# Patient Record
Sex: FEMALE | Race: OTHER | HISPANIC OR LATINO
[De-identification: names, ages, dates, MRNs, and addresses within clinical notes are randomized per-mention and may not be internally consistent; named-entity substitution may affect disease eponyms.]

---

## 2018-05-04 ENCOUNTER — APPOINTMENT (OUTPATIENT)
Dept: UROGYNECOLOGY | Facility: CLINIC | Age: 38
End: 2018-05-04
Payer: COMMERCIAL

## 2018-05-04 ENCOUNTER — LABORATORY RESULT (OUTPATIENT)
Age: 38
End: 2018-05-04

## 2018-05-04 DIAGNOSIS — Z87.440 PERSONAL HISTORY OF URINARY (TRACT) INFECTIONS: ICD-10-CM

## 2018-05-04 PROBLEM — Z00.00 ENCOUNTER FOR PREVENTIVE HEALTH EXAMINATION: Status: ACTIVE | Noted: 2018-05-04

## 2018-05-04 PROCEDURE — 99204 OFFICE O/P NEW MOD 45 MIN: CPT

## 2018-05-04 PROCEDURE — 51798 US URINE CAPACITY MEASURE: CPT

## 2018-06-01 ENCOUNTER — APPOINTMENT (OUTPATIENT)
Dept: UROGYNECOLOGY | Facility: CLINIC | Age: 38
End: 2018-06-01
Payer: COMMERCIAL

## 2018-06-01 PROBLEM — N39.46 URGE AND STRESS INCONTINENCE: Status: ACTIVE | Noted: 2018-05-04

## 2018-06-01 PROCEDURE — 99214 OFFICE O/P EST MOD 30 MIN: CPT

## 2018-06-05 ENCOUNTER — APPOINTMENT (OUTPATIENT)
Dept: UROGYNECOLOGY | Facility: CLINIC | Age: 38
End: 2018-06-05
Payer: COMMERCIAL

## 2018-06-05 DIAGNOSIS — N32.81 OVERACTIVE BLADDER: ICD-10-CM

## 2018-06-05 DIAGNOSIS — N39.41 URGE INCONTINENCE: ICD-10-CM

## 2018-06-05 DIAGNOSIS — N39.46 MIXED INCONTINENCE: ICD-10-CM

## 2018-06-05 DIAGNOSIS — N36.41 HYPERMOBILITY OF URETHRA: ICD-10-CM

## 2018-06-05 DIAGNOSIS — N81.6 RECTOCELE: ICD-10-CM

## 2018-06-05 PROCEDURE — 51784 ANAL/URINARY MUSCLE STUDY: CPT

## 2018-06-05 PROCEDURE — 51798 US URINE CAPACITY MEASURE: CPT

## 2018-06-05 PROCEDURE — 51741 ELECTRO-UROFLOWMETRY FIRST: CPT

## 2018-06-05 PROCEDURE — 51729 CYSTOMETROGRAM W/VP&UP: CPT

## 2018-06-05 PROCEDURE — 51797 INTRAABDOMINAL PRESSURE TEST: CPT

## 2018-06-05 PROCEDURE — 99213 OFFICE O/P EST LOW 20 MIN: CPT | Mod: 25

## 2018-06-15 ENCOUNTER — APPOINTMENT (OUTPATIENT)
Dept: UROGYNECOLOGY | Facility: CLINIC | Age: 38
End: 2018-06-15
Payer: COMMERCIAL

## 2018-06-15 ENCOUNTER — LABORATORY RESULT (OUTPATIENT)
Age: 38
End: 2018-06-15

## 2018-06-15 DIAGNOSIS — N39.3 STRESS INCONTINENCE (FEMALE) (MALE): ICD-10-CM

## 2018-06-15 DIAGNOSIS — N81.4 UTEROVAGINAL PROLAPSE, UNSPECIFIED: ICD-10-CM

## 2018-06-15 PROCEDURE — 99213 OFFICE O/P EST LOW 20 MIN: CPT

## 2018-06-18 RX ORDER — AMOXICILLIN 500 MG/1
500 TABLET, FILM COATED ORAL EVERY 8 HOURS
Qty: 21 | Refills: 0 | Status: ACTIVE | COMMUNITY
Start: 2018-06-18 | End: 1900-01-01

## 2018-06-25 ENCOUNTER — OUTPATIENT (OUTPATIENT)
Dept: OUTPATIENT SERVICES | Facility: HOSPITAL | Age: 38
LOS: 1 days | End: 2018-06-25

## 2018-06-25 ENCOUNTER — APPOINTMENT (OUTPATIENT)
Dept: SURGERY | Facility: CLINIC | Age: 38
End: 2018-06-25

## 2018-06-25 VITALS
DIASTOLIC BLOOD PRESSURE: 74 MMHG | SYSTOLIC BLOOD PRESSURE: 101 MMHG | HEIGHT: 63 IN | WEIGHT: 134.92 LBS | TEMPERATURE: 99 F | HEART RATE: 63 BPM | RESPIRATION RATE: 16 BRPM

## 2018-06-25 DIAGNOSIS — N81.2 INCOMPLETE UTEROVAGINAL PROLAPSE: ICD-10-CM

## 2018-06-25 DIAGNOSIS — N39.3 STRESS INCONTINENCE (FEMALE) (MALE): ICD-10-CM

## 2018-06-25 DIAGNOSIS — Z98.82 BREAST IMPLANT STATUS: Chronic | ICD-10-CM

## 2018-06-25 DIAGNOSIS — K08.409 PARTIAL LOSS OF TEETH, UNSPECIFIED CAUSE, UNSPECIFIED CLASS: Chronic | ICD-10-CM

## 2018-06-25 DIAGNOSIS — N81.11 CYSTOCELE, MIDLINE: ICD-10-CM

## 2018-06-25 DIAGNOSIS — Z86.69 PERSONAL HISTORY OF OTHER DISEASES OF THE NERVOUS SYSTEM AND SENSE ORGANS: Chronic | ICD-10-CM

## 2018-06-25 DIAGNOSIS — E55.9 VITAMIN D DEFICIENCY, UNSPECIFIED: ICD-10-CM

## 2018-06-25 DIAGNOSIS — F41.1 GENERALIZED ANXIETY DISORDER: ICD-10-CM

## 2018-06-25 DIAGNOSIS — Z01.818 ENCOUNTER FOR OTHER PREPROCEDURAL EXAMINATION: ICD-10-CM

## 2018-06-25 LAB
HCG SERPL-ACNC: 0.1 MIU/ML — SIGNIFICANT CHANGE UP
HCT VFR BLD CALC: 39.5 % — SIGNIFICANT CHANGE UP (ref 34.5–45)
HGB BLD-MCNC: 12.5 G/DL — SIGNIFICANT CHANGE UP (ref 11.5–15.5)
MCHC RBC-ENTMCNC: 26.8 PG — LOW (ref 27–34)
MCHC RBC-ENTMCNC: 31.6 G/DL — LOW (ref 32–36)
MCV RBC AUTO: 84.8 FL — SIGNIFICANT CHANGE UP (ref 80–100)
PLATELET # BLD AUTO: 271 K/UL — SIGNIFICANT CHANGE UP (ref 150–400)
RBC # BLD: 4.66 M/UL — SIGNIFICANT CHANGE UP (ref 3.8–5.2)
RBC # FLD: 14.3 % — SIGNIFICANT CHANGE UP (ref 10.3–16.9)
WBC # BLD: 4 K/UL — SIGNIFICANT CHANGE UP (ref 3.8–10.5)
WBC # FLD AUTO: 4 K/UL — SIGNIFICANT CHANGE UP (ref 3.8–10.5)

## 2018-06-25 NOTE — H&P PST ADULT - FAMILY HISTORY
Father  Still living? Yes, Estimated age: Age Unknown  Family history of hypertension in father, Age at diagnosis: Age Unknown     Mother  Still living? Yes, Estimated age: Age Unknown  Family history of hypertension in mother, Age at diagnosis: Age Unknown

## 2018-06-25 NOTE — ASU PATIENT PROFILE, ADULT - PSH
H/O perforation of tympanic membrane  right  Status post breast augmentation    [place] teeth extracted

## 2018-06-25 NOTE — H&P PST ADULT - PSH
H/O perforation of tympanic membrane  right  Status post breast augmentation    [place] teeth extracted

## 2018-06-25 NOTE — H&P PST ADULT - HISTORY OF PRESENT ILLNESS
38 year old female with uterovaginal prolapse(N81.1), stress incontinence(N39.3), and cystocele(N81.11) 38 year old female with uterovaginal prolapse(N81.1), stress incontinence(N39.3), and cystocele(N81.11) for ten years.  Ultrasound confirmed diagnosis.  Patient with UTI being treated with amoxicillin right now.

## 2018-06-26 DIAGNOSIS — F41.1 GENERALIZED ANXIETY DISORDER: ICD-10-CM

## 2018-06-26 DIAGNOSIS — E55.9 VITAMIN D DEFICIENCY, UNSPECIFIED: ICD-10-CM

## 2018-07-03 RX ORDER — AMOXICILLIN 250 MG/5ML
1 SUSPENSION, RECONSTITUTED, ORAL (ML) ORAL
Qty: 0 | Refills: 0 | COMMUNITY

## 2018-07-05 ENCOUNTER — INPATIENT (INPATIENT)
Facility: HOSPITAL | Age: 38
LOS: 0 days | Discharge: HOME | DRG: 743 | End: 2018-07-06
Attending: OBSTETRICS & GYNECOLOGY | Admitting: OBSTETRICS & GYNECOLOGY
Payer: COMMERCIAL

## 2018-07-05 ENCOUNTER — APPOINTMENT (OUTPATIENT)
Dept: UROGYNECOLOGY | Facility: HOSPITAL | Age: 38
End: 2018-07-05
Payer: COMMERCIAL

## 2018-07-05 ENCOUNTER — RESULT REVIEW (OUTPATIENT)
Age: 38
End: 2018-07-05

## 2018-07-05 ENCOUNTER — TRANSCRIPTION ENCOUNTER (OUTPATIENT)
Age: 38
End: 2018-07-05

## 2018-07-05 VITALS
HEIGHT: 64 IN | RESPIRATION RATE: 16 BRPM | TEMPERATURE: 97 F | DIASTOLIC BLOOD PRESSURE: 61 MMHG | WEIGHT: 145.06 LBS | OXYGEN SATURATION: 100 % | SYSTOLIC BLOOD PRESSURE: 115 MMHG | HEART RATE: 55 BPM

## 2018-07-05 DIAGNOSIS — Z86.69 PERSONAL HISTORY OF OTHER DISEASES OF THE NERVOUS SYSTEM AND SENSE ORGANS: Chronic | ICD-10-CM

## 2018-07-05 DIAGNOSIS — Z98.82 BREAST IMPLANT STATUS: Chronic | ICD-10-CM

## 2018-07-05 DIAGNOSIS — K08.409 PARTIAL LOSS OF TEETH, UNSPECIFIED CAUSE, UNSPECIFIED CLASS: Chronic | ICD-10-CM

## 2018-07-05 PROCEDURE — 57283 COLPOPEXY INTRAPERITONEAL: CPT | Mod: 59

## 2018-07-05 PROCEDURE — 57288 REPAIR BLADDER DEFECT: CPT

## 2018-07-05 PROCEDURE — 58262 VAG HYST INCLUDING T/O: CPT

## 2018-07-05 PROCEDURE — 57240 ANTERIOR COLPORRHAPHY: CPT

## 2018-07-05 RX ORDER — DIPHENHYDRAMINE HCL 50 MG
25 CAPSULE ORAL ONCE
Qty: 0 | Refills: 0 | Status: COMPLETED | OUTPATIENT
Start: 2018-07-05 | End: 2018-07-05

## 2018-07-05 RX ORDER — DOCUSATE SODIUM 100 MG
100 CAPSULE ORAL
Qty: 0 | Refills: 0 | Status: DISCONTINUED | OUTPATIENT
Start: 2018-07-06 | End: 2018-07-06

## 2018-07-05 RX ORDER — KETOROLAC TROMETHAMINE 30 MG/ML
30 SYRINGE (ML) INJECTION EVERY 6 HOURS
Qty: 0 | Refills: 0 | Status: COMPLETED | OUTPATIENT
Start: 2018-07-05 | End: 2018-07-06

## 2018-07-05 RX ORDER — OXYCODONE HYDROCHLORIDE 5 MG/1
5 TABLET ORAL EVERY 6 HOURS
Qty: 0 | Refills: 0 | Status: DISCONTINUED | OUTPATIENT
Start: 2018-07-05 | End: 2018-07-06

## 2018-07-05 RX ORDER — METOCLOPRAMIDE HCL 10 MG
10 TABLET ORAL ONCE
Qty: 0 | Refills: 0 | Status: DISCONTINUED | OUTPATIENT
Start: 2018-07-05 | End: 2018-07-06

## 2018-07-05 RX ORDER — CHOLECALCIFEROL (VITAMIN D3) 125 MCG
0 CAPSULE ORAL
Qty: 0 | Refills: 0 | COMMUNITY

## 2018-07-05 RX ORDER — ONDANSETRON 8 MG/1
8 TABLET, FILM COATED ORAL EVERY 6 HOURS
Qty: 0 | Refills: 0 | Status: COMPLETED | OUTPATIENT
Start: 2018-07-05 | End: 2018-07-06

## 2018-07-05 RX ORDER — HYDROMORPHONE HYDROCHLORIDE 2 MG/ML
0.5 INJECTION INTRAMUSCULAR; INTRAVENOUS; SUBCUTANEOUS
Qty: 0 | Refills: 0 | Status: DISCONTINUED | OUTPATIENT
Start: 2018-07-05 | End: 2018-07-05

## 2018-07-05 RX ORDER — ACETAMINOPHEN 500 MG
1000 TABLET ORAL ONCE
Qty: 0 | Refills: 0 | Status: COMPLETED | OUTPATIENT
Start: 2018-07-05 | End: 2018-07-05

## 2018-07-05 RX ORDER — OXYCODONE HYDROCHLORIDE 5 MG/1
10 TABLET ORAL EVERY 6 HOURS
Qty: 0 | Refills: 0 | Status: DISCONTINUED | OUTPATIENT
Start: 2018-07-05 | End: 2018-07-06

## 2018-07-05 RX ORDER — SODIUM CHLORIDE 9 MG/ML
1000 INJECTION, SOLUTION INTRAVENOUS
Qty: 0 | Refills: 0 | Status: DISCONTINUED | OUTPATIENT
Start: 2018-07-05 | End: 2018-07-06

## 2018-07-05 RX ORDER — HYDROMORPHONE HYDROCHLORIDE 2 MG/ML
0.5 INJECTION INTRAMUSCULAR; INTRAVENOUS; SUBCUTANEOUS
Qty: 0 | Refills: 0 | Status: DISCONTINUED | OUTPATIENT
Start: 2018-07-05 | End: 2018-07-06

## 2018-07-05 RX ORDER — ACETAMINOPHEN 500 MG
650 TABLET ORAL EVERY 6 HOURS
Qty: 0 | Refills: 0 | Status: DISCONTINUED | OUTPATIENT
Start: 2018-07-05 | End: 2018-07-06

## 2018-07-05 RX ADMIN — ONDANSETRON 8 MILLIGRAM(S): 8 TABLET, FILM COATED ORAL at 22:16

## 2018-07-05 RX ADMIN — HYDROMORPHONE HYDROCHLORIDE 0.5 MILLIGRAM(S): 2 INJECTION INTRAMUSCULAR; INTRAVENOUS; SUBCUTANEOUS at 11:45

## 2018-07-05 RX ADMIN — HYDROMORPHONE HYDROCHLORIDE 0.5 MILLIGRAM(S): 2 INJECTION INTRAMUSCULAR; INTRAVENOUS; SUBCUTANEOUS at 16:00

## 2018-07-05 RX ADMIN — Medication 30 MILLIGRAM(S): at 17:22

## 2018-07-05 RX ADMIN — HYDROMORPHONE HYDROCHLORIDE 0.5 MILLIGRAM(S): 2 INJECTION INTRAMUSCULAR; INTRAVENOUS; SUBCUTANEOUS at 13:00

## 2018-07-05 RX ADMIN — HYDROMORPHONE HYDROCHLORIDE 0.5 MILLIGRAM(S): 2 INJECTION INTRAMUSCULAR; INTRAVENOUS; SUBCUTANEOUS at 12:10

## 2018-07-05 RX ADMIN — Medication 400 MILLIGRAM(S): at 17:06

## 2018-07-05 RX ADMIN — HYDROMORPHONE HYDROCHLORIDE 0.5 MILLIGRAM(S): 2 INJECTION INTRAMUSCULAR; INTRAVENOUS; SUBCUTANEOUS at 12:25

## 2018-07-05 RX ADMIN — ONDANSETRON 8 MILLIGRAM(S): 8 TABLET, FILM COATED ORAL at 17:07

## 2018-07-05 RX ADMIN — Medication 30 MILLIGRAM(S): at 17:07

## 2018-07-05 RX ADMIN — HYDROMORPHONE HYDROCHLORIDE 0.5 MILLIGRAM(S): 2 INJECTION INTRAMUSCULAR; INTRAVENOUS; SUBCUTANEOUS at 12:45

## 2018-07-05 RX ADMIN — HYDROMORPHONE HYDROCHLORIDE 0.5 MILLIGRAM(S): 2 INJECTION INTRAMUSCULAR; INTRAVENOUS; SUBCUTANEOUS at 19:27

## 2018-07-05 RX ADMIN — HYDROMORPHONE HYDROCHLORIDE 0.5 MILLIGRAM(S): 2 INJECTION INTRAMUSCULAR; INTRAVENOUS; SUBCUTANEOUS at 15:23

## 2018-07-05 RX ADMIN — OXYCODONE HYDROCHLORIDE 10 MILLIGRAM(S): 5 TABLET ORAL at 18:19

## 2018-07-05 RX ADMIN — Medication 25 MILLIGRAM(S): at 13:55

## 2018-07-05 RX ADMIN — Medication 1000 MILLIGRAM(S): at 17:22

## 2018-07-05 RX ADMIN — Medication 650 MILLIGRAM(S): at 23:15

## 2018-07-05 RX ADMIN — HYDROMORPHONE HYDROCHLORIDE 0.5 MILLIGRAM(S): 2 INJECTION INTRAMUSCULAR; INTRAVENOUS; SUBCUTANEOUS at 12:05

## 2018-07-05 RX ADMIN — Medication 650 MILLIGRAM(S): at 22:15

## 2018-07-05 RX ADMIN — HYDROMORPHONE HYDROCHLORIDE 0.5 MILLIGRAM(S): 2 INJECTION INTRAMUSCULAR; INTRAVENOUS; SUBCUTANEOUS at 11:30

## 2018-07-05 RX ADMIN — HYDROMORPHONE HYDROCHLORIDE 0.5 MILLIGRAM(S): 2 INJECTION INTRAMUSCULAR; INTRAVENOUS; SUBCUTANEOUS at 11:50

## 2018-07-05 NOTE — DISCHARGE NOTE ADULT - CARE PROVIDER_API CALL
Latia March (MD), Female Pelvic MedReconst Surg; Obstetrics and Gynecology  [address] [place] [address]  Phone: [phone]  Fax: [phone]

## 2018-07-05 NOTE — PATIENT PROFILE ADULT. - PSH
H/O perforation of tympanic membrane  right  Status post breast augmentation    [place] teeth extracted

## 2018-07-05 NOTE — DISCHARGE NOTE ADULT - CARE PLAN
Principal Discharge DX:	Stress incontinence of urine  Goal:	healthy recovery!  Assessment and plan of treatment:	Nothing in the vagina for 8 weeks and until cleared by Dr. Dorman. Eat a regular diet. No baths/pools. Shower is ok. No heavy lifting for 8 weeks. Go see Dr. Dorman in 1-2 weeks. Return to ER if you have fever >101, severe abdominal pain, heavy vaginal bleeding, or inability to pee. Principal Discharge DX:	Stress incontinence of urine  Goal:	healthy recovery!  Assessment and plan of treatment:	Nothing in the vagina for 8 weeks and until cleared by Dr. Dorman. Eat a regular diet. No baths/pools. Shower is ok. Go see Dr. Dorman in 1 week. Return to ER if you have fever >101, severe abdominal pain, heavy vaginal bleeding, or inability to pee. See separate sheet for further instructions.

## 2018-07-05 NOTE — H&P ADULT - HISTORY OF PRESENT ILLNESS
39yo para 2 with prolapse and stress urinary incontinence presents for hysterectomy.  x 2, kids ages 18 and 13. Symptoms for 10 years. LMP 6/10/18 with heavy menses. Nothing for contraception at this time. No significant medical history. Recently had UTI and completed a course of amoxicillin. Now just on vitamins. Surgical history significant for breast augmentation. NKDA. Hb 12.5.

## 2018-07-05 NOTE — BRIEF OPERATIVE NOTE - PRE-OP DX
Prolapse of anterior vaginal wall  07/05/2018  prolapse and stress urinary incontience  Active  Kya Buenrostro

## 2018-07-05 NOTE — H&P ADULT - PSH
H/O perforation of tympanic membrane  right  Status post breast augmentation    [place] teeth extracted

## 2018-07-05 NOTE — PROGRESS NOTE ADULT - SUBJECTIVE AND OBJECTIVE BOX
GYN PROGRESS NOTE PGY4 - POSTOP CHECK    Patient evaluated at the bedside. No acute events. She just got to the floor from PACU. She is sleepy but states she is still in pain. She received dilaudid 0.5mg x 2 in the PACU as well as benadryl for itching. Will attempt dilaudid 0.5mg now for pain control as patient does not want to take oxycodone and states she is in a significant amount of pain. She will receive tylenol and toradol in a couple hours. She denies current itching. She does not have a rash. She denies nausea/vomiting. Demonstrated incentive spirometer use at the bedside. Braun in place with adequate UOP, still blue from the dye. UOP 150cc/4 hrs in PACU.     T(C): 36.4 (07-05-18 @ 15:04), Max: 36.4 (07-05-18 @ 14:30)  HR: 70 (07-05-18 @ 15:04) (55 - 75)  BP: 100/64 (07-05-18 @ 15:04) (90/52 - 115/61)  RR: 16 (07-05-18 @ 15:04) (13 - 21)  SpO2: 98% (07-05-18 @ 15:04) (98% - 100%)    GEN: patient appears well  LUNGS: no respiratory distress, 02 saturation 91-97% on RA, will keep on 2L NC  ABD: soft, NTND, steri strips cdi  EXT: no calf tenderness        07-05 @ 07:01  -  07-05 @ 15:38  --------------------------------------------------------  IN: 0 mL / OUT: 150 mL / NET: -150 mL

## 2018-07-05 NOTE — DISCHARGE NOTE ADULT - PATIENT PORTAL LINK FT
You can access the [facility] Patient Portal, offered by [facility], by registering with the following website: [email or web address]

## 2018-07-05 NOTE — DISCHARGE NOTE ADULT - PLAN OF CARE
healthy recovery! Nothing in the vagina for 8 weeks and until cleared by Dr. Dorman. Eat a regular diet. No baths/pools. Shower is ok. No heavy lifting for 8 weeks. Go see Dr. Dorman in 1-2 weeks. Return to ER if you have fever >101, severe abdominal pain, heavy vaginal bleeding, or inability to pee. Nothing in the vagina for 8 weeks and until cleared by Dr. Dorman. Eat a regular diet. No baths/pools. Shower is ok. Go see Dr. Dorman in 1 week. Return to ER if you have fever >101, severe abdominal pain, heavy vaginal bleeding, or inability to pee. See separate sheet for further instructions.

## 2018-07-05 NOTE — DISCHARGE NOTE ADULT - MEDICATION SUMMARY - MEDICATIONS TO TAKE
I will START or STAY ON the medications listed below when I get home from the hospital:  None I will START or STAY ON the medications listed below when I get home from the hospital:    Percocet 5/325 oral tablet  -- 1 tab(s) by mouth every 8 hours MDD:MDD: 3 tablets  -- Caution federal law prohibits the transfer of this drug to any person other  than the person for whom it was prescribed.  May cause drowsiness.  Alcohol may intensify this effect.  Use care when operating dangerous machinery.  This prescription cannot be refilled.  This product contains acetaminophen.  Do not use  with any other product containing acetaminophen to prevent possible liver damage.  Using more of this medication than prescribed may cause serious breathing problems.    -- Indication: For Pain    Macrodantin 50 mg oral capsule  -- 1 cap(s) by mouth once a day MDD:MDD: 1 tablet  -- Finish all this medication unless otherwise directed by prescriber.  May discolor urine or feces.  Take with food or milk.    -- Indication: For Catheter

## 2018-07-05 NOTE — PROGRESS NOTE ADULT - ASSESSMENT
A/P: POD0 TVH BS, uterosacral ligament suspension, TVT, anterior repair, cystoscopy  1. FEN - LR@125, ordered for regular diet but has not tried clears yet  2. PAIN - dilaudid 0.5mg q 3 hrs prn, toradol, tylenol, oxycodone prn  3.  - mcdaniel in place with adequate UOP, active TOV in AM  4. RESP - IS, 2L NC  5. VTE prophylaxis - scds, ambulate  6. LABS - AM CBC, BMP  7. DISPO - dc home in AM if stable

## 2018-07-05 NOTE — DISCHARGE NOTE ADULT - HOSPITAL COURSE
Underwent TVH and prolapse repair and TVT. Postop milestones met. Uneventful. Underwent TVH and prolapse repair and TVT. Postop milestones met. Failed active TOV. Uneventful.

## 2018-07-05 NOTE — H&P ADULT - ASSESSMENT
37yo F with prolapse and incontinence  - total vaginal hysterectomy, bilateral salpingectomy, AP repair, midurethral sling, uterosacral suspension, cystoscopy, and any indicated procedures  - admit

## 2018-07-05 NOTE — BRIEF OPERATIVE NOTE - OPERATION/FINDINGS
Anterior wall prolapse. 8 week size mobile anteverted uterus with small posterior fibroid. Normal tubes and ovaries. Uterus and tubes removed. US ligament suspension performed. Anterior repair performed. Trocar used for TVT. Cystoscopy performed 3 times throughout the case and mcdaniel replaced at the end. No posterior repair needed. Rectal exam performed. Trocar sites covered with dermabond and steri strips. EBL 100cc.

## 2018-07-05 NOTE — BRIEF OPERATIVE NOTE - PROCEDURE
<<-----Click on this checkbox to enter Procedure Hysterectomy  07/05/2018  TVH BS, UTEROSACRAL LIGAMENT SUSPENSION, ANTERIOR REPAIR, TVT, CYSTOSCOPY  Active  SBAUM

## 2018-07-06 VITALS
OXYGEN SATURATION: 100 % | TEMPERATURE: 98 F | HEART RATE: 78 BPM | RESPIRATION RATE: 18 BRPM | DIASTOLIC BLOOD PRESSURE: 86 MMHG | SYSTOLIC BLOOD PRESSURE: 153 MMHG

## 2018-07-06 VITALS
DIASTOLIC BLOOD PRESSURE: 68 MMHG | OXYGEN SATURATION: 99 % | HEART RATE: 66 BPM | TEMPERATURE: 98 F | RESPIRATION RATE: 18 BRPM | SYSTOLIC BLOOD PRESSURE: 102 MMHG

## 2018-07-06 LAB
ANION GAP SERPL CALC-SCNC: 11 MMOL/L — SIGNIFICANT CHANGE UP (ref 5–17)
BUN SERPL-MCNC: 7 MG/DL — SIGNIFICANT CHANGE UP (ref 7–23)
CALCIUM SERPL-MCNC: 9.1 MG/DL — SIGNIFICANT CHANGE UP (ref 8.4–10.5)
CHLORIDE SERPL-SCNC: 99 MMOL/L — SIGNIFICANT CHANGE UP (ref 96–108)
CO2 SERPL-SCNC: 26 MMOL/L — SIGNIFICANT CHANGE UP (ref 22–31)
CREAT SERPL-MCNC: 0.73 MG/DL — SIGNIFICANT CHANGE UP (ref 0.5–1.3)
GLUCOSE SERPL-MCNC: 124 MG/DL — HIGH (ref 70–99)
HCT VFR BLD CALC: 32.5 % — LOW (ref 34.5–45)
HGB BLD-MCNC: 10.2 G/DL — LOW (ref 11.5–15.5)
MCHC RBC-ENTMCNC: 27 PG — SIGNIFICANT CHANGE UP (ref 27–34)
MCHC RBC-ENTMCNC: 31.4 G/DL — LOW (ref 32–36)
MCV RBC AUTO: 86 FL — SIGNIFICANT CHANGE UP (ref 80–100)
PLATELET # BLD AUTO: 212 K/UL — SIGNIFICANT CHANGE UP (ref 150–400)
POTASSIUM SERPL-MCNC: 3.5 MMOL/L — SIGNIFICANT CHANGE UP (ref 3.5–5.3)
POTASSIUM SERPL-SCNC: 3.5 MMOL/L — SIGNIFICANT CHANGE UP (ref 3.5–5.3)
RBC # BLD: 3.78 M/UL — LOW (ref 3.8–5.2)
RBC # FLD: 13.9 % — SIGNIFICANT CHANGE UP (ref 10.3–16.9)
SODIUM SERPL-SCNC: 136 MMOL/L — SIGNIFICANT CHANGE UP (ref 135–145)
WBC # BLD: 10.4 K/UL — SIGNIFICANT CHANGE UP (ref 3.8–10.5)
WBC # FLD AUTO: 10.4 K/UL — SIGNIFICANT CHANGE UP (ref 3.8–10.5)

## 2018-07-06 PROCEDURE — 99285 EMERGENCY DEPT VISIT HI MDM: CPT | Mod: 25

## 2018-07-06 RX ORDER — MORPHINE SULFATE 50 MG/1
2 CAPSULE, EXTENDED RELEASE ORAL ONCE
Qty: 0 | Refills: 0 | Status: DISCONTINUED | OUTPATIENT
Start: 2018-07-06 | End: 2018-07-06

## 2018-07-06 RX ORDER — NITROFURANTOIN MACROCRYSTAL 50 MG
1 CAPSULE ORAL
Qty: 7 | Refills: 0 | OUTPATIENT
Start: 2018-07-06 | End: 2018-07-12

## 2018-07-06 RX ORDER — ONDANSETRON 8 MG/1
4 TABLET, FILM COATED ORAL ONCE
Qty: 0 | Refills: 0 | Status: COMPLETED | OUTPATIENT
Start: 2018-07-06 | End: 2018-07-06

## 2018-07-06 RX ORDER — SODIUM CHLORIDE 9 MG/ML
1000 INJECTION INTRAMUSCULAR; INTRAVENOUS; SUBCUTANEOUS ONCE
Qty: 0 | Refills: 0 | Status: COMPLETED | OUTPATIENT
Start: 2018-07-06 | End: 2018-07-06

## 2018-07-06 RX ADMIN — Medication 30 MILLIGRAM(S): at 06:01

## 2018-07-06 RX ADMIN — Medication 30 MILLIGRAM(S): at 00:30

## 2018-07-06 RX ADMIN — ONDANSETRON 8 MILLIGRAM(S): 8 TABLET, FILM COATED ORAL at 06:01

## 2018-07-06 RX ADMIN — OXYCODONE HYDROCHLORIDE 10 MILLIGRAM(S): 5 TABLET ORAL at 01:35

## 2018-07-06 RX ADMIN — Medication 650 MILLIGRAM(S): at 11:02

## 2018-07-06 RX ADMIN — HYDROMORPHONE HYDROCHLORIDE 0.5 MILLIGRAM(S): 2 INJECTION INTRAMUSCULAR; INTRAVENOUS; SUBCUTANEOUS at 02:14

## 2018-07-06 RX ADMIN — HYDROMORPHONE HYDROCHLORIDE 0.5 MILLIGRAM(S): 2 INJECTION INTRAMUSCULAR; INTRAVENOUS; SUBCUTANEOUS at 01:59

## 2018-07-06 RX ADMIN — OXYCODONE HYDROCHLORIDE 10 MILLIGRAM(S): 5 TABLET ORAL at 00:35

## 2018-07-06 RX ADMIN — Medication 650 MILLIGRAM(S): at 07:00

## 2018-07-06 RX ADMIN — Medication 100 MILLIGRAM(S): at 06:01

## 2018-07-06 RX ADMIN — OXYCODONE HYDROCHLORIDE 10 MILLIGRAM(S): 5 TABLET ORAL at 10:50

## 2018-07-06 RX ADMIN — Medication 30 MILLIGRAM(S): at 07:00

## 2018-07-06 RX ADMIN — OXYCODONE HYDROCHLORIDE 10 MILLIGRAM(S): 5 TABLET ORAL at 09:46

## 2018-07-06 RX ADMIN — Medication 650 MILLIGRAM(S): at 06:02

## 2018-07-06 RX ADMIN — Medication 30 MILLIGRAM(S): at 00:00

## 2018-07-06 NOTE — ED ADULT NURSE NOTE - OBJECTIVE STATEMENT
The patient is a 37 y/o female who came in c/o abdominal pain and not able to post hysterectomy yesterday. Pt has urinary catheter placed yesterday during surgery.

## 2018-07-06 NOTE — ED ADULT TRIAGE NOTE - ARRIVAL INFO ADDITIONAL COMMENTS
Pt walked into ED with c/o of suprapubic cramping abd pain. Onset was 4pm after she was discharged from a hysterectomy. Pt has a fley catheter with normal urine output and has passed gassed but has not has bowel movement. Pt states she may be constipated. PT oxycodone at 9pm with no relief. Pt denies vaginal discharge abnormal bleeding, N+V+D, fever. Denies redness or bleeding from surgery site

## 2018-07-06 NOTE — PROGRESS NOTE ADULT - SUBJECTIVE AND OBJECTIVE BOX
GYN PROGRESS NOTE PGY4    Patient evaluated at the bedside. No acute events. Denies CP/SOB/dizziness/nausea/vomiting/abdominal pain/calf pain. Some pain from below overnight but managed on toradol/tylenol/oxycodone. s/p dilaudid. minimal vaginal bleeding. ambulated. no flatus. mcdaniel still in place with adequate UOP. Tolerating regular diet.    T(C): 36.7 (07-06-18 @ 05:52), Max: 37 (07-06-18 @ 00:33)  HR: 66 (07-06-18 @ 05:52) (55 - 80)  BP: 97/64 (07-06-18 @ 05:52) (90/52 - 115/61)  RR: 17 (07-06-18 @ 05:52) (13 - 21)  SpO2: 97% (07-06-18 @ 05:52) (95% - 100%)    GEN: patient appears well  LUNGS: no respiratory distress  ABD: soft, nt, nd, steri strips from TVT cdi  : minimal VB  EXT: no calf tenderness        07-05 @ 07:01  -  07-06 @ 06:31  --------------------------------------------------------  IN: 1750 mL / OUT: 2350 mL / NET: -600 mL

## 2018-07-06 NOTE — ED ADULT NURSE NOTE - PSH
H/O perforation of tympanic membrane  right  Status post breast augmentation    [place] teeth extracted

## 2018-07-06 NOTE — PROGRESS NOTE ADULT - ASSESSMENT
A/P: POD1 TVH, BS, CYSTOSCOPY, US LIGAMENT SUSPENSION, TVT, ANTERIOR REPAIR  1. FEN - on IVH, will have regular breakfast  2. PAIN - OPM, toradol  3.  - mcdaniel in place, will do active TOV this AM  4. RESP -  IS  5. VTE prophylaxis - scd  6. LABS - cbc, bmp  7. DISPO -dc home today if stable

## 2018-07-07 ENCOUNTER — TRANSCRIPTION ENCOUNTER (OUTPATIENT)
Age: 38
End: 2018-07-07

## 2018-07-07 ENCOUNTER — INPATIENT (INPATIENT)
Facility: HOSPITAL | Age: 38
LOS: 0 days | Discharge: HOME | DRG: 948 | End: 2018-07-07
Attending: OBSTETRICS & GYNECOLOGY | Admitting: OBSTETRICS & GYNECOLOGY
Payer: COMMERCIAL

## 2018-07-07 VITALS
TEMPERATURE: 99 F | SYSTOLIC BLOOD PRESSURE: 99 MMHG | OXYGEN SATURATION: 100 % | RESPIRATION RATE: 16 BRPM | HEART RATE: 65 BPM | DIASTOLIC BLOOD PRESSURE: 65 MMHG

## 2018-07-07 DIAGNOSIS — Z86.69 PERSONAL HISTORY OF OTHER DISEASES OF THE NERVOUS SYSTEM AND SENSE ORGANS: Chronic | ICD-10-CM

## 2018-07-07 DIAGNOSIS — K08.409 PARTIAL LOSS OF TEETH, UNSPECIFIED CAUSE, UNSPECIFIED CLASS: Chronic | ICD-10-CM

## 2018-07-07 DIAGNOSIS — Z98.82 BREAST IMPLANT STATUS: Chronic | ICD-10-CM

## 2018-07-07 DIAGNOSIS — Z90.710 ACQUIRED ABSENCE OF BOTH CERVIX AND UTERUS: Chronic | ICD-10-CM

## 2018-07-07 LAB
ALBUMIN SERPL ELPH-MCNC: 3.7 G/DL — SIGNIFICANT CHANGE UP (ref 3.3–5)
ALP SERPL-CCNC: 49 U/L — SIGNIFICANT CHANGE UP (ref 40–120)
ALT FLD-CCNC: 55 U/L — HIGH (ref 10–45)
ANION GAP SERPL CALC-SCNC: 15 MMOL/L — SIGNIFICANT CHANGE UP (ref 5–17)
AST SERPL-CCNC: 76 U/L — HIGH (ref 10–40)
BASOPHILS NFR BLD AUTO: 0.2 % — SIGNIFICANT CHANGE UP (ref 0–2)
BILIRUB SERPL-MCNC: 0.3 MG/DL — SIGNIFICANT CHANGE UP (ref 0.2–1.2)
BUN SERPL-MCNC: 5 MG/DL — LOW (ref 7–23)
CALCIUM SERPL-MCNC: 8.9 MG/DL — SIGNIFICANT CHANGE UP (ref 8.4–10.5)
CHLORIDE SERPL-SCNC: 101 MMOL/L — SIGNIFICANT CHANGE UP (ref 96–108)
CO2 SERPL-SCNC: 23 MMOL/L — SIGNIFICANT CHANGE UP (ref 22–31)
CREAT SERPL-MCNC: 0.65 MG/DL — SIGNIFICANT CHANGE UP (ref 0.5–1.3)
EOSINOPHIL NFR BLD AUTO: 0.7 % — SIGNIFICANT CHANGE UP (ref 0–6)
GLUCOSE SERPL-MCNC: 109 MG/DL — HIGH (ref 70–99)
HCT VFR BLD CALC: 31.5 % — LOW (ref 34.5–45)
HGB BLD-MCNC: 10.2 G/DL — LOW (ref 11.5–15.5)
LIDOCAIN IGE QN: 24 U/L — SIGNIFICANT CHANGE UP (ref 7–60)
LYMPHOCYTES # BLD AUTO: 29.8 % — SIGNIFICANT CHANGE UP (ref 13–44)
MCHC RBC-ENTMCNC: 27.1 PG — SIGNIFICANT CHANGE UP (ref 27–34)
MCHC RBC-ENTMCNC: 32.4 G/DL — SIGNIFICANT CHANGE UP (ref 32–36)
MCV RBC AUTO: 83.8 FL — SIGNIFICANT CHANGE UP (ref 80–100)
MONOCYTES NFR BLD AUTO: 5.9 % — SIGNIFICANT CHANGE UP (ref 2–14)
NEUTROPHILS NFR BLD AUTO: 63.4 % — SIGNIFICANT CHANGE UP (ref 43–77)
PLATELET # BLD AUTO: 188 K/UL — SIGNIFICANT CHANGE UP (ref 150–400)
POTASSIUM SERPL-MCNC: 3.4 MMOL/L — LOW (ref 3.5–5.3)
POTASSIUM SERPL-SCNC: 3.4 MMOL/L — LOW (ref 3.5–5.3)
PROT SERPL-MCNC: 6.8 G/DL — SIGNIFICANT CHANGE UP (ref 6–8.3)
RBC # BLD: 3.76 M/UL — LOW (ref 3.8–5.2)
RBC # FLD: 13.8 % — SIGNIFICANT CHANGE UP (ref 10.3–16.9)
SODIUM SERPL-SCNC: 139 MMOL/L — SIGNIFICANT CHANGE UP (ref 135–145)
WBC # BLD: 5.6 K/UL — SIGNIFICANT CHANGE UP (ref 3.8–10.5)
WBC # FLD AUTO: 5.6 K/UL — SIGNIFICANT CHANGE UP (ref 3.8–10.5)

## 2018-07-07 PROCEDURE — 87086 URINE CULTURE/COLONY COUNT: CPT

## 2018-07-07 PROCEDURE — 74177 CT ABD & PELVIS W/CONTRAST: CPT

## 2018-07-07 PROCEDURE — 99285 EMERGENCY DEPT VISIT HI MDM: CPT | Mod: 25

## 2018-07-07 PROCEDURE — 36415 COLL VENOUS BLD VENIPUNCTURE: CPT

## 2018-07-07 PROCEDURE — 74019 RADEX ABDOMEN 2 VIEWS: CPT | Mod: 26

## 2018-07-07 PROCEDURE — 81001 URINALYSIS AUTO W/SCOPE: CPT

## 2018-07-07 PROCEDURE — 96375 TX/PRO/DX INJ NEW DRUG ADDON: CPT

## 2018-07-07 PROCEDURE — 96374 THER/PROPH/DIAG INJ IV PUSH: CPT | Mod: XU

## 2018-07-07 PROCEDURE — 74019 RADEX ABDOMEN 2 VIEWS: CPT

## 2018-07-07 PROCEDURE — 74177 CT ABD & PELVIS W/CONTRAST: CPT | Mod: 26

## 2018-07-07 PROCEDURE — 85025 COMPLETE CBC W/AUTO DIFF WBC: CPT

## 2018-07-07 PROCEDURE — 83690 ASSAY OF LIPASE: CPT

## 2018-07-07 PROCEDURE — 80053 COMPREHEN METABOLIC PANEL: CPT

## 2018-07-07 RX ORDER — NITROFURANTOIN MACROCRYSTAL 50 MG
50 CAPSULE ORAL DAILY
Qty: 0 | Refills: 0 | Status: DISCONTINUED | OUTPATIENT
Start: 2018-07-07 | End: 2018-07-07

## 2018-07-07 RX ORDER — OXYCODONE HYDROCHLORIDE 5 MG/1
10 TABLET ORAL EVERY 6 HOURS
Qty: 0 | Refills: 0 | Status: DISCONTINUED | OUTPATIENT
Start: 2018-07-07 | End: 2018-07-07

## 2018-07-07 RX ORDER — ACETAMINOPHEN 500 MG
650 TABLET ORAL EVERY 6 HOURS
Qty: 0 | Refills: 0 | Status: DISCONTINUED | OUTPATIENT
Start: 2018-07-07 | End: 2018-07-07

## 2018-07-07 RX ORDER — IBUPROFEN 200 MG
600 TABLET ORAL EVERY 6 HOURS
Qty: 0 | Refills: 0 | Status: DISCONTINUED | OUTPATIENT
Start: 2018-07-07 | End: 2018-07-07

## 2018-07-07 RX ORDER — MORPHINE SULFATE 50 MG/1
2 CAPSULE, EXTENDED RELEASE ORAL ONCE
Qty: 0 | Refills: 0 | Status: DISCONTINUED | OUTPATIENT
Start: 2018-07-07 | End: 2018-07-07

## 2018-07-07 RX ORDER — OXYCODONE AND ACETAMINOPHEN 5; 325 MG/1; MG/1
2 TABLET ORAL EVERY 6 HOURS
Qty: 0 | Refills: 0 | Status: DISCONTINUED | OUTPATIENT
Start: 2018-07-07 | End: 2018-07-07

## 2018-07-07 RX ORDER — MAGNESIUM HYDROXIDE 400 MG/1
30 TABLET, CHEWABLE ORAL
Qty: 0 | Refills: 0 | Status: DISCONTINUED | OUTPATIENT
Start: 2018-07-07 | End: 2018-07-07

## 2018-07-07 RX ORDER — OXYCODONE HYDROCHLORIDE 5 MG/1
5 TABLET ORAL EVERY 4 HOURS
Qty: 0 | Refills: 0 | Status: DISCONTINUED | OUTPATIENT
Start: 2018-07-07 | End: 2018-07-07

## 2018-07-07 RX ORDER — IOHEXOL 300 MG/ML
30 INJECTION, SOLUTION INTRAVENOUS ONCE
Qty: 0 | Refills: 0 | Status: COMPLETED | OUTPATIENT
Start: 2018-07-07 | End: 2018-07-07

## 2018-07-07 RX ORDER — NITROFURANTOIN MACROCRYSTAL 50 MG
50 CAPSULE ORAL ONCE
Qty: 0 | Refills: 0 | Status: DISCONTINUED | OUTPATIENT
Start: 2018-07-07 | End: 2018-07-07

## 2018-07-07 RX ORDER — SODIUM CHLORIDE 9 MG/ML
1000 INJECTION, SOLUTION INTRAVENOUS
Qty: 0 | Refills: 0 | Status: DISCONTINUED | OUTPATIENT
Start: 2018-07-07 | End: 2018-07-07

## 2018-07-07 RX ORDER — SIMETHICONE 80 MG/1
80 TABLET, CHEWABLE ORAL
Qty: 0 | Refills: 0 | Status: DISCONTINUED | OUTPATIENT
Start: 2018-07-07 | End: 2018-07-07

## 2018-07-07 RX ORDER — DOCUSATE SODIUM 100 MG
100 CAPSULE ORAL
Qty: 0 | Refills: 0 | Status: DISCONTINUED | OUTPATIENT
Start: 2018-07-07 | End: 2018-07-07

## 2018-07-07 RX ORDER — SENNA PLUS 8.6 MG/1
2 TABLET ORAL AT BEDTIME
Qty: 0 | Refills: 0 | Status: DISCONTINUED | OUTPATIENT
Start: 2018-07-07 | End: 2018-07-07

## 2018-07-07 RX ADMIN — SODIUM CHLORIDE 1000 MILLILITER(S): 9 INJECTION INTRAMUSCULAR; INTRAVENOUS; SUBCUTANEOUS at 00:06

## 2018-07-07 RX ADMIN — Medication 650 MILLIGRAM(S): at 17:56

## 2018-07-07 RX ADMIN — Medication 600 MILLIGRAM(S): at 07:09

## 2018-07-07 RX ADMIN — OXYCODONE HYDROCHLORIDE 5 MILLIGRAM(S): 5 TABLET ORAL at 17:20

## 2018-07-07 RX ADMIN — MORPHINE SULFATE 2 MILLIGRAM(S): 50 CAPSULE, EXTENDED RELEASE ORAL at 04:54

## 2018-07-07 RX ADMIN — Medication 600 MILLIGRAM(S): at 12:13

## 2018-07-07 RX ADMIN — Medication 600 MILLIGRAM(S): at 13:10

## 2018-07-07 RX ADMIN — MORPHINE SULFATE 2 MILLIGRAM(S): 50 CAPSULE, EXTENDED RELEASE ORAL at 00:04

## 2018-07-07 RX ADMIN — Medication 650 MILLIGRAM(S): at 17:55

## 2018-07-07 RX ADMIN — MORPHINE SULFATE 2 MILLIGRAM(S): 50 CAPSULE, EXTENDED RELEASE ORAL at 01:58

## 2018-07-07 RX ADMIN — ONDANSETRON 4 MILLIGRAM(S): 8 TABLET, FILM COATED ORAL at 00:06

## 2018-07-07 RX ADMIN — Medication 600 MILLIGRAM(S): at 17:55

## 2018-07-07 RX ADMIN — Medication 650 MILLIGRAM(S): at 13:10

## 2018-07-07 RX ADMIN — OXYCODONE HYDROCHLORIDE 5 MILLIGRAM(S): 5 TABLET ORAL at 10:17

## 2018-07-07 RX ADMIN — Medication 650 MILLIGRAM(S): at 12:13

## 2018-07-07 RX ADMIN — Medication 50 MILLIGRAM(S): at 12:13

## 2018-07-07 RX ADMIN — SODIUM CHLORIDE 70 MILLILITER(S): 9 INJECTION, SOLUTION INTRAVENOUS at 04:54

## 2018-07-07 RX ADMIN — OXYCODONE HYDROCHLORIDE 5 MILLIGRAM(S): 5 TABLET ORAL at 11:10

## 2018-07-07 RX ADMIN — Medication 650 MILLIGRAM(S): at 07:09

## 2018-07-07 RX ADMIN — Medication 600 MILLIGRAM(S): at 17:56

## 2018-07-07 RX ADMIN — OXYCODONE HYDROCHLORIDE 5 MILLIGRAM(S): 5 TABLET ORAL at 16:24

## 2018-07-07 RX ADMIN — Medication 100 MILLIGRAM(S): at 07:09

## 2018-07-07 RX ADMIN — Medication 100 MILLIGRAM(S): at 17:55

## 2018-07-07 RX ADMIN — IOHEXOL 30 MILLILITER(S): 300 INJECTION, SOLUTION INTRAVENOUS at 02:41

## 2018-07-07 RX ADMIN — MORPHINE SULFATE 2 MILLIGRAM(S): 50 CAPSULE, EXTENDED RELEASE ORAL at 05:00

## 2018-07-07 NOTE — PROGRESS NOTE ADULT - ASSESSMENT
A/P:  38y   POD#2 after a TVH, BS, AP repair, midurethral sling, uterosacral suspension for hx of prolapse and EMILY presenting for worsening abdominal pain.  - Pain: s/p IVP Morphine 2g x2 overnight, Motrin ATC, Tylenol ATC, Oxycodone PRN, pain better controlled this AM  - : f/u CT abd/pelvis, per radiology resident no abnormalities noted, mcdaniel remains in place with adequate urine output, continue with Macrodantin 50mg daily (for 7days) for UTI prophylaxis  - GI: regular diet, +flatus, -BM,  bowel regimen including colace BID, Senna daily, Simethicone PRN, Milk of Magnesium PRN  - Fluids: LR @ 70cc, can d/c once increased intake   - Electrolytes: replete as needed  - Labs: CBC and BNP stable from discharge labs, repeat CBC/BNP labs in afternoon   -patient evaluated with Dr. Clarisa Pleitez PGY4 A/P:  38y   POD#2 after a TVH, BS, AP repair, midurethral sling, uterosacral suspension for hx of prolapse and EMILY presenting for worsening abdominal pain.  - Pain: s/p IVP Morphine 2g x2 overnight, Motrin ATC, Tylenol ATC, Oxycodone PRN, pain better controlled this AM  - : f/u CT abd/pelvis final read, per radiology resident no abnormalities noted, mcdaniel remains in place with adequate urine output, continue with Macrodantin 50mg daily (for 7days) for UTI prophylaxis  - GI: regular diet, +flatus, -BM,  bowel regimen including colace BID, Senna daily, Simethicone PRN, Milk of Magnesium PRN  - Fluids: LR @ 70cc, can d/c once increased intake   - Electrolytes: replete as needed  - Labs: CBC and BNP stable from discharge labs, repeat CBC/BNP labs in afternoon   -patient evaluated with Dr. Clarisa Pleitez PGY4

## 2018-07-07 NOTE — DISCHARGE NOTE ADULT - CARE PROVIDER_API CALL
Latia March (MD), Female Pelvic MedReconst Surg; Obstetrics and Gynecology  [address] [place] [address]  Phone: [phone]  Fax: [phone]

## 2018-07-07 NOTE — DISCHARGE NOTE ADULT - CONDITIONS AT DISCHARGE
stable, mcdaniel catheter remained intact, patent with clear yellow adequate output, was observed ambulated numerous times in the room

## 2018-07-07 NOTE — CONSULT NOTE ADULT - SUBJECTIVE AND OBJECTIVE BOX
38y   POD#2 after a TVH, BS, AP repair, midurethral sling, uterosacral suspension for hx of prolapse and EMILY presenting for lower abdominal pain. Patient was discharged POD1 with mcdaniel given failed TOV and motrin and percocets for pain.      . Symptoms for 10 years.   No significant medical history. Recently had UTI and completed a course of amoxicillin. Now just on vitamins. Surgical history significant for breast augmentation. NKDA. Hb 12.5.     Denies fever, chills, chest pain, palpitations, SOB, n/v.  +flatus, +BM    OB H/x:  G1-    G2-     Gyn H/x:  Menstrual Cycle:   LMP 6/10/18 with heavy menses.  H/x of cysts, fibroids, endometriosis:  H/x of STIs:  Current Contraceptive use: s/p TVH and BS  Name of GYN Physician:     PAST MEDICAL & SURGICAL HISTORY:  Urinary incontinence  [place] teeth extracted  H/O perforation of tympanic membrane: right  Status post breast augmentation  TVH, BS, AP repair, midurethral sling, uterosacral suspension    MEDICATIONS  (STANDING):    MEDICATIONS  (PRN):      Allergies    No Known Allergies    Intolerances        SH:  Denies     Vital Signs Last 24 Hrs  T(C): 36.7 (2018 23:19), Max: 36.7 (2018 05:52)  T(F): 98.1 (2018 23:19), Max: 98.1 (2018 05:52)  HR: 78 (2018 23:19) (66 - 78)  BP: 153/86 (2018 23:19) (97/64 - 153/86)  BP(mean): --  RR: 18 (2018 23:19) (17 - 18)  SpO2: 100% (2018 23:19) (97% - 100%)    Physical Exam:  Gen: NAD  GI: soft, nontender, nondistended + BS, no rebound no guarding  BME: normal anteverted uterus, no adnexal masses appreciated    Spec:  Ext: wnl      LABS:                        10.2   5.6   )-----------( 188      ( 2018 00:06 )             31.5     07-07    139  |  101  |  5<L>  ----------------------------<  109<H>  3.4<L>   |  23  |  0.65    Ca    8.9      2018 00:06    TPro  6.8  /  Alb  3.7  /  TBili  0.3  /  DBili  x   /  AST  76<H>  /  ALT  55<H>  /  AlkPhos  49  07-07    PT/INR - ( 2018 06:55 )   PT: 11.7 sec;   INR: 1.05          PTT - ( 2018 06:55 )  PTT:32.4 sec  Urinalysis Basic - ( 2018 23:42 )    Color: Yellow / Appearance: Clear / S.015 / pH: x  Gluc: x / Ketone: NEGATIVE  / Bili: Negative / Urobili: 0.2 E.U./dL   Blood: x / Protein: NEGATIVE mg/dL / Nitrite: NEGATIVE   Leuk Esterase: NEGATIVE / RBC: 5-10 /HPF / WBC < 5 /HPF   Sq Epi: x / Non Sq Epi: 0-5 /HPF / Bacteria: Present /HPF        RADIOLOGY & ADDITIONAL STUDIES:

## 2018-07-07 NOTE — DISCHARGE NOTE ADULT - PLAN OF CARE
healthy recovery! See discharge instructions sheet.  - nothing per vagina 8 weeks - go see Dr. March on Friday

## 2018-07-07 NOTE — PROGRESS NOTE ADULT - ASSESSMENT
A/P: POD2 readmitted for pain control, now with excellent pain control after BM  all milestones met  discharge instructions reviewed  plan to discharge home

## 2018-07-07 NOTE — DISCHARGE NOTE ADULT - PATIENT PORTAL LINK FT
You can access the [facility] Patient Portal, offered by [facility], by registering with the following website: [email or web address]

## 2018-07-07 NOTE — H&P ADULT - HISTORY OF PRESENT ILLNESS
38y   POD#2 after a TVH, BS, AP repair, midurethral sling, uterosacral suspension for hx of prolapse and EMILY presenting for lower abdominal pain. Patient was discharged yesterday morning, POD1,  with pain well controlled on motrin and percocets. At home she took her motrin and percocets with minimal relief, thought pain was 2/2 constipation, thus took a laxative, with no BM or pain relief.  States pain is "cramp" like pain in lower abdominal region and some lower back pain in certain positions. Has scant vaginal bleeding. Passing flatus and tolerating regular diet. Was sent home with mcdaniel and Macrodantin 50mg Qd, given failed TOV. Denies abnormal vaginal discharge, fever/chills, or nausea/vomiting.         OB H/x:  G1-    G2-     Gyn H/x:  hx of prolapse and EMILY   Menstrual Cycle:   LMP 6/10/18 with heavy menses.  Current Contraceptive use: s/p TVH and BS     PAST MEDICAL & SURGICAL HISTORY:  Urinary incontinence  [place] teeth extracted  H/O perforation of tympanic membrane: right  Status post breast augmentation  TVH, BS, AP repair, midurethral sling, uterosacral suspension    MEDICATIONS: Percocets 5/325, Macrodantin 50mg qD for 7days      Allergies: No Known Allergies    Vital Signs Last 24 Hrs  T(C): 36.7 (2018 23:19), Max: 36.7 (2018 05:52)  T(F): 98.1 (2018 23:19), Max: 98.1 (2018 05:52)  HR: 78 (2018 23:19) (66 - 78)  BP: 153/86 (2018 23:19) (97/64 - 153/86)  BP(mean): --  RR: 18 (2018 23:19) (17 - 18)  SpO2: 100% (2018 23:19) (97% - 100%)    Physical Exam:  Gen: NAD  GI: soft, nontender, nondistended + BS, no rebound no guarding  :  mons incision sites C/D/I  Spec: vaginal cuff intact, scant dark red blood noted in vault, no abnormal discharge, no evidence of dehiscence   BME: (per Dr. Pleitez) vaginal cuff intact, appropriately tender on exam   Ext: wnl      LABS:                        10.2   5.6   )-----------( 188      ( 2018 00:06 )             31.5     07-07    139  |  101  |  5<L>  ----------------------------<  109<H>  3.4<L>   |  23  |  0.65    Ca    8.9      2018 00:06    TPro  6.8  /  Alb  3.7  /  TBili  0.3  /  DBili  x   /  AST  76<H>  /  ALT  55<H>  /  AlkPhos  49  07-07    PT/INR - ( 2018 06:55 )   PT: 11.7 sec;   INR: 1.05          PTT - ( 2018 06:55 )  PTT:32.4 sec  Urinalysis Basic - ( 2018 23:42 )    Color: Yellow / Appearance: Clear / S.015 / pH: x  Gluc: x / Ketone: NEGATIVE  / Bili: Negative / Urobili: 0.2 E.U./dL   Blood: x / Protein: NEGATIVE mg/dL / Nitrite: NEGATIVE   Leuk Esterase: NEGATIVE / RBC: 5-10 /HPF / WBC < 5 /HPF   Sq Epi: x / Non Sq Epi: 0-5 /HPF / Bacteria: Present /HPF 38y   POD#2 after a TVH, BS, AP repair, midurethral sling, uterosacral suspension for hx of prolapse and EMILY presenting for lower abdominal pain. Patient was discharged yesterday morning, POD1,  with pain well controlled on motrin and percocets. At home she took her motrin and percocets with minimal relief, thought pain was 2/2 constipation, thus took a laxative, with no BM or pain relief.  States pain is "cramp" like pain in lower abdominal region and some lower back pain in certain positions. Has scant vaginal bleeding. Passing flatus and tolerating regular diet. Was sent home with mcdaniel and Macrodantin 50mg Qd, given failed TOV. Denies abnormal vaginal discharge, fever/chills, or nausea/vomiting. +flatus        OB H/x:  G1-    G2-     Gyn H/x:  hx of prolapse and EMILY   Menstrual Cycle:   LMP 6/10/18 with heavy menses.  Current Contraceptive use: s/p TVH and BS     PAST MEDICAL & SURGICAL HISTORY:  Urinary incontinence  [place] teeth extracted  H/O perforation of tympanic membrane: right  Status post breast augmentation  TVH, BS, AP repair, midurethral sling, uterosacral suspension    MEDICATIONS: Percocets 5/325, Macrodantin 50mg qD for 7days      Allergies: No Known Allergies    Vital Signs Last 24 Hrs  T(C): 36.7 (2018 23:19), Max: 36.7 (2018 05:52)  T(F): 98.1 (2018 23:19), Max: 98.1 (2018 05:52)  HR: 78 (2018 23:19) (66 - 78)  BP: 153/86 (2018 23:19) (97/64 - 153/86)  BP(mean): --  RR: 18 (2018 23:19) (17 - 18)  SpO2: 100% (2018 23:19) (97% - 100%)    Physical Exam:  Gen: NAD  GI: soft, nontender, nondistended + BS, no rebound no guarding  :  mons incision sites C/D/I  Spec: vaginal cuff intact, scant dark red blood noted in vault, no abnormal discharge, no evidence of dehiscence   BME: (per Dr. Pleitez) vaginal cuff intact, appropriately tender on exam   Ext: wnl      LABS:                        10.2   5.6   )-----------( 188      ( 2018 00:06 )             31.5     07-07    139  |  101  |  5<L>  ----------------------------<  109<H>  3.4<L>   |  23  |  0.65    Ca    8.9      2018 00:06    TPro  6.8  /  Alb  3.7  /  TBili  0.3  /  DBili  x   /  AST  76<H>  /  ALT  55<H>  /  AlkPhos  49  07-07    PT/INR - ( 2018 06:55 )   PT: 11.7 sec;   INR: 1.05          PTT - ( 2018 06:55 )  PTT:32.4 sec  Urinalysis Basic - ( 2018 23:42 )    Color: Yellow / Appearance: Clear / S.015 / pH: x  Gluc: x / Ketone: NEGATIVE  / Bili: Negative / Urobili: 0.2 E.U./dL   Blood: x / Protein: NEGATIVE mg/dL / Nitrite: NEGATIVE   Leuk Esterase: NEGATIVE / RBC: 5-10 /HPF / WBC < 5 /HPF   Sq Epi: x / Non Sq Epi: 0-5 /HPF / Bacteria: Present /HPF

## 2018-07-07 NOTE — DISCHARGE NOTE ADULT - INSTRUCTIONS
follow discharge instructions given to you by Dr. FAHAD March. Call Dr. March if you have any questions or concerns

## 2018-07-07 NOTE — ED PROVIDER NOTE - OBJECTIVE STATEMENT
38F 38F s/p vaginal hysterectomy 7/5, c/o severe abd pain. pt was discharged 7/6, states pain worsened throughout the day.  +nausea. no vomiting. no fevers. passing gas, however no bowel movement.  states took oxycodone and motrin without relief. no fevers.  pt failed TOV and was discharged with mcdaniel.

## 2018-07-07 NOTE — ED PROVIDER NOTE - PSH
H/O perforation of tympanic membrane  right  Status post breast augmentation    [place] teeth extracted H/O perforation of tympanic membrane  right  H/O vaginal hysterectomy    Status post breast augmentation    [place] teeth extracted

## 2018-07-07 NOTE — PATIENT PROFILE ADULT. - PSH
H/O perforation of tympanic membrane  right  H/O vaginal hysterectomy    Status post breast augmentation    [place] teeth extracted

## 2018-07-07 NOTE — ED PROVIDER NOTE - MEDICAL DECISION MAKING DETAILS
s/p hysterectomy, worsening abd pain, mcdaniel in place draining clear urine  -check labs, ivf, morphine, kub, gyn consulted

## 2018-07-07 NOTE — PROGRESS NOTE ADULT - SUBJECTIVE AND OBJECTIVE BOX
GYN Progress Note    Patient seen at bedside this morning. Resting comfortable in bed. Once moved from ED to room she obtained morphine around 5am and since then her pain has been minimal and was able to get good rest. States she feels much better than when she presented last night and has noticed significant improvement with cramping pain.   Tolerating regular diet, but given her pain has had decrease intake.       Vital Signs Last 24 Hrs  T(C): 37 (2018 04:30), Max: 37 (2018 03:43)  T(F): 98.6 (2018 04:30), Max: 98.6 (2018 03:43)  HR: 57 (2018 04:30) (57 - 78)  BP: 117/79 (2018 04:30) (102/68 - 153/86)  BP(mean): --  RR: 14 (2018 04:30) (14 - 18)  SpO2: 100% (2018 04:30) (99% - 100%)    Physical Exam:  Gen: No Acute Distress  Pulm: Normal work of breathing  GI: soft, appropriately tender, nondistended, +BS, no rebound, no guarding.  Ext: no edema/erythema/tenderness    I&O's Summary    2018 07:01  -  2018 07:00  --------------------------------------------------------  IN: 210 mL / OUT: 1200 mL / NET: -990 mL      MEDICATIONS  (STANDING):  acetaminophen   Tablet. 650 milliGRAM(s) Oral every 6 hours  docusate sodium 100 milliGRAM(s) Oral two times a day  ibuprofen  Tablet 600 milliGRAM(s) Oral every 6 hours  lactated ringers. 1000 milliLiter(s) (70 mL/Hr) IV Continuous <Continuous>  nitrofurantoin 50 milliGRAM(s) Oral daily  senna 2 Tablet(s) Oral at bedtime    MEDICATIONS  (PRN):  magnesium hydroxide Suspension 30 milliLiter(s) Oral two times a day PRN Constipation  oxyCODONE    IR 5 milliGRAM(s) Oral every 4 hours PRN Moderate Pain (4 - 6)  oxyCODONE    IR 10 milliGRAM(s) Oral every 6 hours PRN Severe Pain (7 - 10)  simethicone 80 milliGRAM(s) Chew two times a day PRN Gas      LABS:                        10.2   5.6   )-----------( 188      ( 2018 00:06 )             31.5     07    139  |  101  |  5<L>  ----------------------------<  109<H>  3.4<L>   |  23  |  0.65    Ca    8.9      2018 00:06    TPro  6.8  /  Alb  3.7  /  TBili  0.3  /  DBili  x   /  AST  76<H>  /  ALT  55<H>  /  AlkPhos  49  07      Urinalysis Basic - ( 2018 23:42 )    Color: Yellow / Appearance: Clear / S.015 / pH: x  Gluc: x / Ketone: NEGATIVE  / Bili: Negative / Urobili: 0.2 E.U./dL   Blood: x / Protein: NEGATIVE mg/dL / Nitrite: NEGATIVE   Leuk Esterase: NEGATIVE / RBC: 5-10 /HPF / WBC < 5 /HPF   Sq Epi: x / Non Sq Epi: 0-5 /HPF / Bacteria: Present /HPF

## 2018-07-07 NOTE — H&P ADULT - PSH
H/O perforation of tympanic membrane  right  H/O vaginal hysterectomy    Status post breast augmentation    [place] teeth extracted

## 2018-07-07 NOTE — H&P ADULT - ASSESSMENT
A/P:  38y   POD#2 after a TVH, BS, AP repair, midurethral sling, uterosacral suspension for hx of prolapse and EMILY presenting for worsening abdominal pain.  - Pain: s/p IVP Morphine 2g x2 in ED, Motrin ATC, Tylenol ATC, Percocets PRN  - : CT abd/pelvis w/ Oral and IV contrast  to rule out possible complication of surgery including intra-abdominal bleeding/infection, mcdaniel remains in place with adequate urine output, continue with Macrodantin 50mg daily (for 7days)  - GI: regular diet, passing flatus,  start a bowel regimen including colace BID and senna daily  - Fluids: LR @ 70cc  - Electrolytes: replete as needed  - Labs: CBC and BNP stable from discharge labs, repeat CBC/BNP labs in afternoon   -patient evaluated with Dr. Clarisa Pleitez PGY4, plan d/w Dr. Pleitez and Dr. March

## 2018-07-07 NOTE — PROGRESS NOTE ADULT - SUBJECTIVE AND OBJECTIVE BOX
GYN PROGRESS NOTE PGY4    Patient evaluated at the bedside. No acute events. She has had bowel movements today and feels much better. Pain is well controlled with oral tylenol and motrin, and occasional oxycodone. On her antibiotic for UTI prevention for indwelling catheter. ambulating. tolerating regular diet. Minimal vaginal bleeding.  Denies CP/SOB/dizziness/nausea/vomiting/abdominal pain/calf pain.    T(C): 37.2 (07-07-18 @ 13:15), Max: 37.2 (07-07-18 @ 13:15)  HR: 59 (07-07-18 @ 13:15) (57 - 78)  BP: 100/66 (07-07-18 @ 13:15) (92/57 - 153/86)  RR: 16 (07-07-18 @ 13:15) (14 - 18)  SpO2: 100% (07-07-18 @ 13:15) (100% - 100%)    GEN: patient appears well  LUNGS: no respiratory distress  ABD: soft, NTND  EXT: no calf tenderness        07-06 @ 07:01  -  07-07 @ 07:00  --------------------------------------------------------  IN: 280 mL / OUT: 1200 mL / NET: -920 mL    07-07 @ 07:01  -  07-07 @ 15:43  --------------------------------------------------------  IN: 360 mL / OUT: 150 mL / NET: 210 mL

## 2018-07-07 NOTE — DISCHARGE NOTE ADULT - CARE PLAN
Principal Discharge DX:	H/O vaginal hysterectomy  Goal:	healthy recovery!  Assessment and plan of treatment:	See discharge instructions sheet.  - nothing per vagina 8 weeks - go see Dr. March on Friday

## 2018-07-10 DIAGNOSIS — N81.11 CYSTOCELE, MIDLINE: ICD-10-CM

## 2018-07-10 DIAGNOSIS — N81.2 INCOMPLETE UTEROVAGINAL PROLAPSE: ICD-10-CM

## 2018-07-10 DIAGNOSIS — N39.3 STRESS INCONTINENCE (FEMALE) (MALE): ICD-10-CM

## 2018-07-11 ENCOUNTER — APPOINTMENT (OUTPATIENT)
Dept: UROGYNECOLOGY | Facility: CLINIC | Age: 38
End: 2018-07-11
Payer: COMMERCIAL

## 2018-07-11 VITALS
DIASTOLIC BLOOD PRESSURE: 68 MMHG | OXYGEN SATURATION: 99 % | HEART RATE: 79 BPM | TEMPERATURE: 99.5 F | SYSTOLIC BLOOD PRESSURE: 114 MMHG
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
## 2018-07-11 DIAGNOSIS — G89.18 OTHER ACUTE POSTPROCEDURAL PAIN: ICD-10-CM

## 2018-07-11 DIAGNOSIS — R10.9 UNSPECIFIED ABDOMINAL PAIN: ICD-10-CM

## 2018-07-11 DIAGNOSIS — Z90.79 ACQUIRED ABSENCE OF OTHER GENITAL ORGAN(S): ICD-10-CM

## 2018-07-11 DIAGNOSIS — Z90.710 ACQUIRED ABSENCE OF BOTH CERVIX AND UTERUS: ICD-10-CM

## 2018-07-11 PROCEDURE — 99212 OFFICE O/P EST SF 10 MIN: CPT

## 2018-07-11 RX ORDER — SIMETHICONE 80 MG/1
80 TABLET, CHEWABLE ORAL
Qty: 40 | Refills: 0 | Status: ACTIVE | COMMUNITY
Start: 2018-07-11 | End: 1900-01-01

## 2018-07-12 RX ORDER — CIPROFLOXACIN HYDROCHLORIDE 500 MG/1
500 TABLET, FILM COATED ORAL TWICE DAILY
Qty: 10 | Refills: 0 | Status: ACTIVE | COMMUNITY
Start: 2018-07-12 | End: 1900-01-01

## 2018-07-18 ENCOUNTER — APPOINTMENT (OUTPATIENT)
Dept: UROGYNECOLOGY | Facility: CLINIC | Age: 38
End: 2018-07-18
Payer: COMMERCIAL

## 2018-07-18 DIAGNOSIS — R33.9 RETENTION OF URINE, UNSPECIFIED: ICD-10-CM

## 2018-07-18 PROBLEM — R32 UNSPECIFIED URINARY INCONTINENCE: Chronic | Status: ACTIVE | Noted: 2018-06-25

## 2018-07-18 PROCEDURE — 99024 POSTOP FOLLOW-UP VISIT: CPT

## 2018-07-18 RX ORDER — CIPROFLOXACIN HYDROCHLORIDE 500 MG/1
500 TABLET, FILM COATED ORAL
Qty: 28 | Refills: 0 | Status: ACTIVE | COMMUNITY
Start: 2018-07-18 | End: 1900-01-01

## 2018-07-19 PROBLEM — R33.9 INCOMPLETE EMPTYING OF BLADDER: Status: ACTIVE | Noted: 2018-07-19

## 2018-07-20 ENCOUNTER — APPOINTMENT (OUTPATIENT)
Dept: UROGYNECOLOGY | Facility: CLINIC | Age: 38
End: 2018-07-20

## 2018-07-20 LAB — BACTERIA UR CULT: NORMAL

## 2018-07-22 PROBLEM — N81.6 RECTOCELE: Status: ACTIVE | Noted: 2018-06-05

## 2018-07-27 ENCOUNTER — APPOINTMENT (OUTPATIENT)
Dept: UROGYNECOLOGY | Facility: CLINIC | Age: 38
End: 2018-07-27

## 2018-08-10 ENCOUNTER — APPOINTMENT (OUTPATIENT)
Dept: UROGYNECOLOGY | Facility: CLINIC | Age: 38
End: 2018-08-10
Payer: COMMERCIAL

## 2018-08-10 PROCEDURE — 99024 POSTOP FOLLOW-UP VISIT: CPT

## 2018-10-05 ENCOUNTER — APPOINTMENT (OUTPATIENT)
Dept: UROGYNECOLOGY | Facility: CLINIC | Age: 38
End: 2018-10-05
Payer: COMMERCIAL

## 2018-10-05 PROCEDURE — 99024 POSTOP FOLLOW-UP VISIT: CPT

## 2018-12-26 PROCEDURE — 86901 BLOOD TYPING SEROLOGIC RH(D): CPT

## 2018-12-26 PROCEDURE — 88305 TISSUE EXAM BY PATHOLOGIST: CPT

## 2018-12-26 PROCEDURE — 80048 BASIC METABOLIC PNL TOTAL CA: CPT

## 2018-12-26 PROCEDURE — C1889: CPT

## 2018-12-26 PROCEDURE — 85027 COMPLETE CBC AUTOMATED: CPT

## 2018-12-26 PROCEDURE — 85610 PROTHROMBIN TIME: CPT

## 2018-12-26 PROCEDURE — 86900 BLOOD TYPING SEROLOGIC ABO: CPT

## 2018-12-26 PROCEDURE — C1771: CPT

## 2018-12-26 PROCEDURE — 86850 RBC ANTIBODY SCREEN: CPT

## 2018-12-26 PROCEDURE — 88341 IMHCHEM/IMCYTCHM EA ADD ANTB: CPT

## 2018-12-26 PROCEDURE — 85384 FIBRINOGEN ACTIVITY: CPT

## 2018-12-26 PROCEDURE — 36415 COLL VENOUS BLD VENIPUNCTURE: CPT

## 2018-12-26 PROCEDURE — 85730 THROMBOPLASTIN TIME PARTIAL: CPT

## 2019-03-29 ENCOUNTER — APPOINTMENT (OUTPATIENT)
Dept: UROGYNECOLOGY | Facility: CLINIC | Age: 39
End: 2019-03-29
Payer: COMMERCIAL

## 2019-03-29 PROCEDURE — 99214 OFFICE O/P EST MOD 30 MIN: CPT

## 2019-03-29 NOTE — PHYSICAL EXAM
[No Acute Distress] : in no acute distress [Well developed] : well developed [Well Nourished] : ~L well nourished [Good Hygeine] : demonstrates good hygeine [Labia Majora] : were normal [Labia Minora] : were normal [Pink Rugae] : pink rugae [Estrogen Effect] : estrogen effect was observed [Normal] : was normal [Absent] : absent [Exam Deferred] : was deferred [Scant] : there was scant vaginal bleeding [FreeTextEntry4] : ? 1 Gortex suture at the left corner apex but unable to excise due to a long vaginal length and good support [de-identified] : good support

## 2019-03-29 NOTE — HISTORY OF PRESENT ILLNESS
[FreeTextEntry1] : The pt presents c/o pink vaginal discharge for 3 wks.  She is doing well otherwise. Denies EMILY, voiding difficulty, dyspareunia.\par Her hx is sig for undergoing TVH, bilateral salpingectomy, USLS and TVT 6 months ago.

## 2019-03-29 NOTE — ASSESSMENT
[FreeTextEntry1] : An attempt was made to excise the suture in the office bu was unsuccessful.  She was given the option of leaving the suture or having the excision under sedation.  She will consider.

## 2019-07-11 ENCOUNTER — APPOINTMENT (OUTPATIENT)
Dept: UROGYNECOLOGY | Facility: CLINIC | Age: 39
End: 2019-07-11

## 2019-08-30 ENCOUNTER — APPOINTMENT (OUTPATIENT)
Dept: UROGYNECOLOGY | Facility: CLINIC | Age: 39
End: 2019-08-30
Payer: COMMERCIAL

## 2019-08-30 PROCEDURE — 99213 OFFICE O/P EST LOW 20 MIN: CPT

## 2019-08-30 NOTE — LETTER BODY
[I had the pleasure of evaluating your patient, [unfilled]. Thank you for referring Ms. [unfilled] for consultation for ___] : I had the pleasure of evaluating your patient, [unfilled]. Thank you for referring Ms. [unfilled] for consultation for [unfilled]. [Dear  ___] : Dear  [unfilled], [Attached please find my note.] : Attached please find my note. [Thank you very much for allowing me to participate in the care of this patient. If you have any questions, please do not hesitate to contact me] : Thank you very much for allowing me to participate in the care of this patient. If you have any questions, please do not hesitate to contact me.

## 2019-08-30 NOTE — ASSESSMENT
[FreeTextEntry1] : She will be scheduled for suture excision in the OR as she is unable to tolerate it in the office.

## 2019-08-30 NOTE — HISTORY OF PRESENT ILLNESS
[FreeTextEntry1] : The pt is here for a f/u for vaginal spotting after undergoing TVH, USLS in 7/2018.\par She was noted to have suture erosion but she was not able to tolerate the excision in the office in 3/2019.\par She reports that she is continuing to have vaginal spotting and would like to consider surgical excision.

## 2019-08-30 NOTE — PHYSICAL EXAM
[No Acute Distress] : in no acute distress [Well developed] : well developed [Well Nourished] : ~L well nourished [Good Hygeine] : demonstrates good hygeine [Normal Mood/Affect] : mood and affect are normal [Normal Appearance] : general appearance was normal [Estrogen Effect] : estrogen effect was observed [Foreign Body] : a foreign body in the vault [No Bleeding] : there was no active vaginal bleeding [Anxiety] : patient is not anxious [FreeTextEntry4] : 2 Gortx suture at the apex [de-identified] : excellent support

## 2019-09-20 ENCOUNTER — APPOINTMENT (OUTPATIENT)
Dept: UROGYNECOLOGY | Facility: CLINIC | Age: 39
End: 2019-09-20
Payer: COMMERCIAL

## 2019-09-20 ENCOUNTER — LABORATORY RESULT (OUTPATIENT)
Age: 39
End: 2019-09-20

## 2019-09-20 PROCEDURE — 99213 OFFICE O/P EST LOW 20 MIN: CPT

## 2019-09-20 NOTE — ASSESSMENT
[FreeTextEntry1] : Mary from suture extrusion.  Since she was not able to tolerate the excision in the office, it will be performed in the OR under sedation.\par R/B/A dw the pt and a written consent was obtained

## 2019-09-20 NOTE — HISTORY OF PRESENT ILLNESS
[FreeTextEntry1] : The pt is her for a f/u visit for vaginal spotting from suture erosion.  Sh eis ready to proceed with excision of the sutures as she was not able to tolerate the procedure in the office.

## 2019-09-29 PROBLEM — G89.18 POST-OP PAIN: Status: ACTIVE | Noted: 2019-09-29

## 2019-09-29 RX ORDER — OXYCODONE AND ACETAMINOPHEN 5; 325 MG/1; MG/1
5-325 TABLET ORAL
Qty: 5 | Refills: 0 | Status: ACTIVE | COMMUNITY
Start: 2019-09-29 | End: 1900-01-01

## 2019-09-30 ENCOUNTER — APPOINTMENT (OUTPATIENT)
Dept: UROGYNECOLOGY | Facility: AMBULATORY SURGERY CENTER | Age: 39
End: 2019-09-30
Payer: COMMERCIAL

## 2019-09-30 ENCOUNTER — OUTPATIENT (OUTPATIENT)
Dept: OUTPATIENT SERVICES | Facility: HOSPITAL | Age: 39
LOS: 1 days | Discharge: HOME | End: 2019-09-30
Payer: COMMERCIAL

## 2019-09-30 ENCOUNTER — RESULT REVIEW (OUTPATIENT)
Age: 39
End: 2019-09-30

## 2019-09-30 DIAGNOSIS — K08.409 PARTIAL LOSS OF TEETH, UNSPECIFIED CAUSE, UNSPECIFIED CLASS: Chronic | ICD-10-CM

## 2019-09-30 DIAGNOSIS — Z90.710 ACQUIRED ABSENCE OF BOTH CERVIX AND UTERUS: Chronic | ICD-10-CM

## 2019-09-30 DIAGNOSIS — Z98.82 BREAST IMPLANT STATUS: Chronic | ICD-10-CM

## 2019-09-30 DIAGNOSIS — G89.18 OTHER ACUTE POSTPROCEDURAL PAIN: ICD-10-CM

## 2019-09-30 DIAGNOSIS — Z86.69 PERSONAL HISTORY OF OTHER DISEASES OF THE NERVOUS SYSTEM AND SENSE ORGANS: Chronic | ICD-10-CM

## 2019-09-30 PROCEDURE — XXXXX: CPT

## 2019-09-30 PROCEDURE — 57415 REMOVE VAGINAL FOREIGN BODY: CPT

## 2019-10-02 LAB — SURGICAL PATHOLOGY STUDY: SIGNIFICANT CHANGE UP

## 2019-10-17 ENCOUNTER — APPOINTMENT (OUTPATIENT)
Dept: UROGYNECOLOGY | Facility: CLINIC | Age: 39
End: 2019-10-17
Payer: COMMERCIAL

## 2019-10-17 ENCOUNTER — APPOINTMENT (OUTPATIENT)
Dept: UROGYNECOLOGY | Facility: CLINIC | Age: 39
End: 2019-10-17

## 2019-10-17 DIAGNOSIS — Z86.19 PERSONAL HISTORY OF OTHER INFECTIOUS AND PARASITIC DISEASES: ICD-10-CM

## 2019-10-17 PROCEDURE — 99024 POSTOP FOLLOW-UP VISIT: CPT

## 2019-10-17 RX ORDER — FLUCONAZOLE 150 MG/1
150 TABLET ORAL
Qty: 2 | Refills: 0 | Status: ACTIVE | COMMUNITY
Start: 2019-10-17 | End: 1900-01-01

## 2019-10-17 NOTE — ASSESSMENT
[FreeTextEntry1] : The pt has appropriate healing and is doing welll. Diflucan 150 mg x 2 prescribed.  She will f/u on PRN bases.\par

## 2019-10-17 NOTE — SUBJECTIVE
[FreeTextEntry1] : Reports that she is doing fine but is having some discharge.  Otherwise, she is doing well

## 2019-10-17 NOTE — OBJECTIVE
[Soft and Nontender] : soft and nontender [Clean, Dry, Intact] : Clean, Dry, Intact [Good Support] : Good support [No Masses or Tenderness] : no masses or tenderness [Healing well] : healing well [FreeTextEntry3] : white, thick discharge

## 2020-05-28 ENCOUNTER — APPOINTMENT (OUTPATIENT)
Dept: UROGYNECOLOGY | Facility: CLINIC | Age: 40
End: 2020-05-28
Payer: COMMERCIAL

## 2020-05-28 PROCEDURE — 99213 OFFICE O/P EST LOW 20 MIN: CPT

## 2020-05-28 NOTE — PHYSICAL EXAM
[No Acute Distress] : in no acute distress [Well developed] : well developed [Well Nourished] : ~L well nourished [Good Hygeine] : demonstrates good hygeine [Normal] : was normal [Normal Appearance] : general appearance was normal [Pink Rugae] : pink rugae [Tenderness] : tenderness [Scant] : there was scant vaginal bleeding [Absent] : absent [FreeTextEntry4] : tender with vaginal spotting in the left vaginal cuff, no sutures visible [de-identified] : good support

## 2020-05-28 NOTE — ASSESSMENT
[FreeTextEntry1] : Although I was not able to identify the source of bleeding, my suspicion is that  it is suture erosion.\par She will be scheduled for EUA and probable excision of suture and vaginal cuff revision

## 2020-05-28 NOTE — HISTORY OF PRESENT ILLNESS
[FreeTextEntry1] : The pt is here for a f/u after undergoing TVH, USLS, TVT in 2018.  Her post-op course  was complicated by suture erosion needing to undergo excision in the OR in 2019 as she was not able to tolerate the procedure in the office.\par She reports resolution of vaginal spotting for 2 wks after the excision of te sutures but has been spotting again since then.  It is bothersome.  She denies dyspareunia

## 2020-07-20 ENCOUNTER — APPOINTMENT (OUTPATIENT)
Dept: UROGYNECOLOGY | Facility: CLINIC | Age: 40
End: 2020-07-20

## 2020-07-30 ENCOUNTER — APPOINTMENT (OUTPATIENT)
Dept: UROGYNECOLOGY | Facility: CLINIC | Age: 40
End: 2020-07-30
Payer: COMMERCIAL

## 2020-07-30 VITALS
SYSTOLIC BLOOD PRESSURE: 110 MMHG | TEMPERATURE: 97.4 F | HEIGHT: 63 IN | BODY MASS INDEX: 23.57 KG/M2 | WEIGHT: 133 LBS | DIASTOLIC BLOOD PRESSURE: 70 MMHG

## 2020-07-30 DIAGNOSIS — N93.9 ABNORMAL UTERINE AND VAGINAL BLEEDING, UNSPECIFIED: ICD-10-CM

## 2020-07-30 PROCEDURE — 99213 OFFICE O/P EST LOW 20 MIN: CPT

## 2020-07-30 NOTE — ASSESSMENT
[FreeTextEntry1] : She will be scheduled for excision of suture and vaginal cuff revision.\par R/B/A tiffanie the pt

## 2020-07-30 NOTE — HISTORY OF PRESENT ILLNESS
[FreeTextEntry1] : The pt is here for a f/u visit for vaginal bleeding and cramping at times.\par Her hx is sig for undergoing TVH with USLS which was followed by excision of vaginal sutures for bothersome spotting from suture erosion.

## 2020-08-01 LAB — BACTERIA UR CULT: NORMAL

## 2020-08-10 ENCOUNTER — TRANSCRIPTION ENCOUNTER (OUTPATIENT)
Age: 40
End: 2020-08-10

## 2020-08-11 ENCOUNTER — APPOINTMENT (OUTPATIENT)
Dept: UROGYNECOLOGY | Facility: AMBULATORY SURGERY CENTER | Age: 40
End: 2020-08-11
Payer: COMMERCIAL

## 2020-08-11 ENCOUNTER — RESULT REVIEW (OUTPATIENT)
Age: 40
End: 2020-08-11

## 2020-08-11 ENCOUNTER — OUTPATIENT (OUTPATIENT)
Dept: OUTPATIENT SERVICES | Facility: HOSPITAL | Age: 40
LOS: 1 days | Discharge: HOME | End: 2020-08-11
Payer: COMMERCIAL

## 2020-08-11 DIAGNOSIS — Z86.69 PERSONAL HISTORY OF OTHER DISEASES OF THE NERVOUS SYSTEM AND SENSE ORGANS: Chronic | ICD-10-CM

## 2020-08-11 DIAGNOSIS — K08.409 PARTIAL LOSS OF TEETH, UNSPECIFIED CAUSE, UNSPECIFIED CLASS: Chronic | ICD-10-CM

## 2020-08-11 DIAGNOSIS — Z90.710 ACQUIRED ABSENCE OF BOTH CERVIX AND UTERUS: Chronic | ICD-10-CM

## 2020-08-11 DIAGNOSIS — Z98.82 BREAST IMPLANT STATUS: Chronic | ICD-10-CM

## 2020-08-11 PROCEDURE — 57135 EXCISION VAGINAL CYST/TUMOR: CPT

## 2020-08-11 PROCEDURE — 88304 TISSUE EXAM BY PATHOLOGIST: CPT | Mod: 26

## 2020-08-11 PROCEDURE — 57200 REPAIR OF VAGINA: CPT

## 2020-08-21 LAB — SURGICAL PATHOLOGY STUDY: SIGNIFICANT CHANGE UP

## 2020-08-27 ENCOUNTER — APPOINTMENT (OUTPATIENT)
Dept: UROGYNECOLOGY | Facility: CLINIC | Age: 40
End: 2020-08-27
Payer: COMMERCIAL

## 2020-08-27 PROCEDURE — 99024 POSTOP FOLLOW-UP VISIT: CPT

## 2020-08-27 NOTE — ASSESSMENT
[FreeTextEntry1] : Doing well. Advised her to avoid vaginal penetration for another 1-2 months.\par She will f/u in 4-6 wks.

## 2020-08-27 NOTE — SUBJECTIVE
[FreeTextEntry1] : The pt reports   that at post op 2 wks,  she still has vaginal discharge.  Denies pain.

## 2020-08-27 NOTE — OBJECTIVE
[Soft and Nontender] : soft and nontender [Clean, Dry, Intact] : Clean, Dry, Intact [Good Support] : Good support [Healing well] : healing well [No Masses or Tenderness] : no masses or tenderness [FreeTextEntry3] : sutures visible, good support. minimal discharge

## 2020-10-04 PROBLEM — Z98.890 POST-OPERATIVE STATE: Status: ACTIVE | Noted: 2018-07-11

## 2020-10-05 ENCOUNTER — APPOINTMENT (OUTPATIENT)
Dept: UROGYNECOLOGY | Facility: CLINIC | Age: 40
End: 2020-10-05
Payer: MEDICAID

## 2020-10-05 VITALS
TEMPERATURE: 97.5 F | SYSTOLIC BLOOD PRESSURE: 110 MMHG | WEIGHT: 130 LBS | BODY MASS INDEX: 23.04 KG/M2 | DIASTOLIC BLOOD PRESSURE: 70 MMHG | HEIGHT: 63 IN

## 2020-10-05 DIAGNOSIS — Z98.890 OTHER SPECIFIED POSTPROCEDURAL STATES: ICD-10-CM

## 2020-10-05 PROCEDURE — 99024 POSTOP FOLLOW-UP VISIT: CPT

## 2020-10-05 RX ORDER — ESTRADIOL 0.1 MG/G
0.1 CREAM VAGINAL
Qty: 1 | Refills: 3 | Status: ACTIVE | COMMUNITY
Start: 2020-10-05 | End: 1900-01-01

## 2020-10-05 NOTE — ASSESSMENT
[FreeTextEntry1] : Still with granulation tissue but no sutures.\par Treated with silver nitrate sticks.\par She was also prescribed with estrogen cream for now

## 2020-10-05 NOTE — OBJECTIVE
[Good Support] : Good support [No Masses or Tenderness] : no masses or tenderness [FreeTextEntry3] : no suture(s) visible or palpable.  Granulation tissue in the left corner at the apex.  Silver nitrate sticks applied

## 2020-12-16 PROBLEM — Z87.440 HISTORY OF URINARY TRACT INFECTION: Status: RESOLVED | Noted: 2018-05-04 | Resolved: 2020-12-16

## 2020-12-21 PROBLEM — Z86.19 HISTORY OF CANDIDIASIS OF VAGINA: Status: RESOLVED | Noted: 2019-10-17 | Resolved: 2020-12-21

## 2022-11-10 NOTE — DISCHARGE NOTE ADULT - FUNCTIONAL STATUS DATE
Specialty Pharmacy - Refill Coordination    Specialty Medication Orders Linked to Encounter      Flowsheet Row Most Recent Value   Medication #1 dupilumab (DUPIXENT SYRINGE) 300 mg/2 mL Syrg (Order#[id number], Rx#[id number])            Refill Questions - Documented Responses      Flowsheet Row Most Recent Value   Patient Availability and HIPAA Verification    Does patient want to proceed with activity? Yes   HIPAA/medical authority confirmed? Yes   Relationship to patient of person spoken to? Self   Refill Screening Questions    Changes to allergies? No   Changes to medications? No   New conditions since last clinic visit? No   Unplanned office visit, urgent care, ED, or hospital admission in the last 4 weeks? No   How does patient/caregiver feel medication is working? Good   Financial problems or insurance changes? No   How many doses of your specialty medications were missed in the last 4 weeks? 0   Would patient like to speak to a pharmacist? No   When does the patient need to receive the medication? 11/17/22   Refill Delivery Questions    How will the patient receive the medication? [facility]   When does the patient need to receive the medication? 11/17/22   Shipping Address Home   Address in [facility] confirmed and updated if neccessary? Yes   Expected Copay ($) 0   Is the patient able to afford the medication copay? Yes   Payment Method zero copay   Days supply of Refill 28   Supplies needed? No supplies needed   Refill activity completed? Yes   Refill activity plan Refill scheduled   Shipment/Pickup Date: 11/14/22            Current Outpatient Medications   Medication Sig    albuterol (PROVENTIL) 2.5 mg /3 mL (0.083 %) nebulizer solution Inhale into the lungs.    aspirin (ECOTRIN) 81 MG EC tablet Take 81 mg by mouth.    azelastine (ASTELIN) 137 mcg (0.1 %) nasal spray 2 sprays (274 mcg total) by Nasal route 2 (two) times daily.    budesonide (PULMICORT) 0.25 mg/2 mL nebulizer solution Inhale 0.25 mg into  the lungs.    dupilumab (DUPIXENT SYRINGE) 300 mg/2 mL Syrg Inject 2 mLs (300 mg total) into the skin every 14 (fourteen) days.    ergocalciferol, vitamin D2, (VITAMIN D ORAL) Take 1 tablet by mouth.    estrogens, conjugated, (PREMARIN) 0.625 MG tablet Take 0.625 mg by mouth.    famotidine (PEPCID) 20 MG tablet Take 20 mg by mouth.    fexofenadine HCl (ALLEGRA ORAL) Take by mouth.    fluticasone-salmeterol diskus inhaler 250-50 mcg Inhale 1 puff into the lungs 2 (two) times daily. Controller    levothyroxine (SYNTHROID) 150 MCG tablet Take 150 mcg by mouth once daily.    melatonin 5 mg Cap Take by mouth.    montelukast (SINGULAIR) 10 mg tablet Take 10 mg by mouth once daily.    montelukast (SINGULAIR) 10 mg tablet Take 1 tablet (10 mg total) by mouth every evening.    multivitamin capsule Take 1 capsule by mouth.    pantoprazole (PROTONIX) 40 MG tablet Take 40 mg by mouth once daily.    rosuvastatin (CRESTOR) 5 MG tablet Take 5 mg by mouth once daily.   Last reviewed on 9/13/2022  4:41 PM by Anastasiia Barraza, PharmD    Review of patient's allergies indicates:   Allergen Reactions    Levofloxacin Other (See Comments)     TENDONITIS WITH LEVAQUIN    Sulfa (sulfonamide antibiotics)     Last reviewed on  9/13/2022 4:42 PM by Anastasiia Barraza      Tasks added this encounter   12/8/2022 - Refill Call (Auto Added)   Tasks due within next 3 months   No tasks due.     [facility]  [address] [place] [id number]  Phone: [phone]  Fax: [phone]         06-Jul-2018

## 2022-11-29 NOTE — DISCHARGE NOTE ADULT - HOSPITAL COURSE
Last Filled:11/2/2022  MORPHINE SULFATE  15 MG  60  30  0    Last Filled:11/1/2022  MORPHINE SULFATE  15 MG  60  30  0    Last Office Visit: 08/03/22     Next Office Visit: 12/05/22           Admitted for pain control. Received IV morphine and then switched to oral pain medication which has been adequate. Remained stable.

## 2023-04-03 NOTE — H&P PST ADULT - PROBLEM/PLAN-2
RN team - Please call patient with results.  I would suggest another BMP end of week and do a renal US.  Renal function improved but no back to normal.  Do PET scan with Dr. Limon
DISPLAY PLAN FREE TEXT

## 2023-05-29 NOTE — ED ADULT TRIAGE NOTE - CCCP TRG CHIEF CMPLNT
Goal Outcome Evaluation: Pt alert and oriented, pleasant. Good appetite. Purewick patent but continues to urinate when replacing purewick and incontinent of large amounts of urine with diuretic when changing purewick and when moving in bed. Incontinent of stool today as well. Hep gtt begun as angio tomorrow? Skin folds have powder and interdry at times. Turns and repositions in bed and up with 1- 2 assist gait belt and walker. PT and up in chair this afternoon. Tylenol for right knee pain. Tele afib CVR. Bilateral LE edema 2+ feet ankles. Will monitor.                         post operative complication

## 2024-01-25 ENCOUNTER — EMERGENCY (EMERGENCY)
Facility: HOSPITAL | Age: 44
LOS: 1 days | Discharge: HOME | End: 2024-01-25
Admitting: EMERGENCY MEDICINE
Payer: COMMERCIAL

## 2024-01-25 VITALS
OXYGEN SATURATION: 98 % | HEART RATE: 80 BPM | SYSTOLIC BLOOD PRESSURE: 121 MMHG | DIASTOLIC BLOOD PRESSURE: 76 MMHG | RESPIRATION RATE: 18 BRPM | TEMPERATURE: 98 F | WEIGHT: 139.99 LBS

## 2024-01-25 DIAGNOSIS — Z90.710 ACQUIRED ABSENCE OF BOTH CERVIX AND UTERUS: Chronic | ICD-10-CM

## 2024-01-25 DIAGNOSIS — N64.4 MASTODYNIA: ICD-10-CM

## 2024-01-25 DIAGNOSIS — N63.10 UNSPECIFIED LUMP IN THE RIGHT BREAST, UNSPECIFIED QUADRANT: ICD-10-CM

## 2024-01-25 DIAGNOSIS — Z86.69 PERSONAL HISTORY OF OTHER DISEASES OF THE NERVOUS SYSTEM AND SENSE ORGANS: Chronic | ICD-10-CM

## 2024-01-25 DIAGNOSIS — K08.409 PARTIAL LOSS OF TEETH, UNSPECIFIED CAUSE, UNSPECIFIED CLASS: Chronic | ICD-10-CM

## 2024-01-25 DIAGNOSIS — Z98.82 BREAST IMPLANT STATUS: ICD-10-CM

## 2024-01-25 DIAGNOSIS — Z98.82 BREAST IMPLANT STATUS: Chronic | ICD-10-CM

## 2024-01-25 PROCEDURE — 99284 EMERGENCY DEPT VISIT MOD MDM: CPT

## 2024-01-25 PROCEDURE — 76642 ULTRASOUND BREAST LIMITED: CPT | Mod: 26,RT

## 2024-01-25 PROCEDURE — 99284 EMERGENCY DEPT VISIT MOD MDM: CPT | Mod: 25

## 2024-01-25 PROCEDURE — 76642 ULTRASOUND BREAST LIMITED: CPT

## 2024-01-25 RX ORDER — IBUPROFEN 200 MG
600 TABLET ORAL ONCE
Refills: 0 | Status: COMPLETED | OUTPATIENT
Start: 2024-01-25 | End: 2024-01-25

## 2024-01-25 RX ADMIN — Medication 600 MILLIGRAM(S): at 15:36

## 2024-01-25 RX ADMIN — Medication 600 MILLIGRAM(S): at 19:33

## 2024-01-25 NOTE — ED PROVIDER NOTE - PATIENT PORTAL LINK FT
You can access the FollowMyHealth Patient Portal offered by [facility] by registering at the following website: [email or web address]. By joining [employer]’s FollowMyHealth portal, you will also be able to view your health information using other applications (apps) compatible with our system.

## 2024-01-25 NOTE — ED ADULT NURSE NOTE - NSICDXPASTSURGICALHX_GEN_ALL_CORE_FT
PAST SURGICAL HISTORY:  H/O perforation of tympanic membrane right    H/O vaginal hysterectomy     Status post breast augmentation     [place] teeth extracted

## 2024-01-25 NOTE — ED PROVIDER NOTE - CLINICAL SUMMARY MEDICAL DECISION MAKING FREE TEXT BOX
pt c/o r breast pain, has implants in place (x 15yrs), on exam + tender, mobile nodule, ? cyst vs abscess vs implant rupture, us done and showed a cyst, importance of f/u w/breast surgeon for f/u and eval discussed at length, prn ibuprofen, pt understands and agrees w/plan, strict return precautions given

## 2024-01-25 NOTE — ED PROVIDER NOTE - CARE PROVIDER_API CALL
Kevan Ward  Surgery  [address] [address]  Phone: [phone]  Fax: [phone]  Follow Up Time:     Humera Delgadillo  Surgery  [address] [address]  Phone: [phone]  Fax: [phone]  Follow Up Time:

## 2024-01-25 NOTE — ED ADULT NURSE NOTE - OBJECTIVE STATEMENT
Presents for c/o pain to R breast with swelling x 2 days, notes pmh breast implants. Denies fevers/chills.     On assessment- AOx4, breathing even and unlabored on RA, no apparent distress, VSS in triage, able to speak in clear coherent sentences, steady gait unassisted, neuro intact with no apparent facial asymmetry, PERRLA. Exam of breast deferred for PA/MD team.

## 2024-01-25 NOTE — ED PROVIDER NOTE - GENITOURINARY, MLM
breasts: + symmetry, no retractions, no nipple dc. no erythema or rash, + tender, mobile nodule @3o'clock of R breast breasts: + symmetry, no retractions, no nipple dc. no erythema or rash, + tender, mobile nodule @9o'clock of R breast

## 2024-01-25 NOTE — ED PROVIDER NOTE - NSICDXPASTSURGICALHX_GEN_ALL_CORE_FT
PAST SURGICAL HISTORY:  H/O perforation of tympanic membrane right    H/O vaginal hysterectomy     Status post breast augmentation     [place] teeth extracted

## 2024-01-25 NOTE — ED PROVIDER NOTE - OBJECTIVE STATEMENT
The pt is a 44 y/o F, who presents to ED c/o R breast pain x 1 wk. Pt w/breast implants (placed about 15 yrs ago). Pain to R outer aspect, + tenderness to touch, states that area feels swollen. Has not taken any pain meds. Denies trauma, redness, nipple discharge, fevers, chills.

## 2024-01-25 NOTE — ED PROVIDER NOTE - IMAGING STUDIES ADDITIONAL INFORMATION FREE TEXT
us report obtained from resident - pt w/cyst no implant rupture, due to significant report delay - pt dc'd based on verbal read

## 2024-01-25 NOTE — ED PROVIDER NOTE - NSFOLLOWUPINSTRUCTIONS_ED_ALL_ED_FT
Breast Cyst  Side view of a breast with breast cysts.  A breast cyst is a sac in the breast that is filled with fluid. These cysts are usually noncancerous (benign). Breast cysts are most often in the upper, outer portion of the breast. One or more cysts may develop. They form when fluid builds up inside the breast glands.  There are several types of breast cysts. Some are too small to feel but can be seen with imaging tests, such as an X-ray of the breast (mammogram) or ultrasound.  Breast cysts do not increase your risk of breast cancer. They usually disappear after you no longer have a menstrual cycle (after menopause), unless you take artificial hormones (are on hormone replacement therapy or menopausal hormone therapy).  What are the causes?  This condition may be caused by:  Blockage of tubes (ducts) in the breast glands, which leads to fluid buildup. Duct blockage may result from:  Fibrocystic breast changes. This is a common, benign condition that occurs when women go through hormonal changes during the menstrual cycle. This is a common cause of multiple breast cysts.  Overgrowth of breast tissue or breast glands.  Scar tissue in the breast from previous surgery.  Changes in certain female hormones (estrogen and progesterone).  The exact cause of this condition is not known.  What increases the risk?  You may be more likely to develop breast cysts from 30 years of age until the time of menopause.  What are the signs or symptoms?  Symptoms of this condition include:  Feeling one or more smooth, round, soft lumps (like grapes) in the breast that are easily movable. The lump or lumps may get bigger and more painful before your menstrual period and get smaller after your menstrual period. They may also be painless.  Breast discomfort or pain.  How is this diagnosed?  This condition may be diagnosed based on:  A physical exam. Depending on the size of the cyst, your health care provider may be able to feel it during this exam.  Imaging tests, such as a mammogram or ultrasound.  Fluid may be removed from the cyst with a needle (fine-needle aspiration) and tested to make sure the cyst is not cancerous.  How is this treated?  Treatment may not be needed for this condition. Your health care provider may monitor the cyst to see if it goes away on its own. If the cyst is uncomfortable or gets bigger, or if you do not like how the cyst makes your breast look, you may need treatment. Treatment may include:  Hormone therapy.  Fine-needle aspiration to drain fluid from the cyst. There is a chance of the cyst coming back (recurring) after aspiration.  Surgery to remove the cyst.  Follow these instructions at home:  Self-exams  A person doing a breast self-exam, checking the upper part of the breast, around the nipple, and under the nipple.  Talk to your health care provider about breast self-exams. If recommended, do the exams as told. A breast self-exam involves:  Comparing your breasts in the mirror.  Looking for visible changes in your skin or nipples.  Feeling for lumps or changes.  Having many breast cysts may make it harder to feel for new lumps. Understand how your breasts normally look and feel, and write down any changes in your breasts. Tell your health care provider about any changes.  Eating and drinking  Follow instructions from your health care provider about what you may eat and drink.  Drink enough fluid to keep your urine pale yellow.  Avoid caffeine.  Cut down on salt (sodium) in what you eat and drink, especially before your menstrual period. Too much sodium can cause fluid buildup, breast swelling, and discomfort.  General instructions  See your health care provider regularly.  Get a yearly physical exam.  If you are 20–40 years old, get a clinical breast exam every 1–3 years. After the age of 40 years, get this exam every year.  Get mammograms as often as directed.  Take over-the-counter and prescription medicines only as told by your health care provider.  Wear a supportive bra, especially when exercising.  Contact a health care provider if:  You feel, or think you feel, a lump in your breast.  You notice that both breasts look or feel different than usual.  Your breast is still causing pain after your menstrual period is over.  You find new lumps or bumps that were not there before.  You feel lumps in your armpit.  Get help right away if:  You have severe pain, tenderness, redness, or warmth in your breast.  You have fluid or blood leaking from your nipple.  Your breast lump becomes hard and painful.  You notice dimpling or wrinkling of the breast or nipple.  Summary  A breast cyst is a sac in the breast that is filled with fluid.  Treatment may not be needed for this condition.  If the cyst is uncomfortable or gets bigger, or if you do not like how the cyst makes your breast look, you may need treatment. Breast Cyst  take ibuprofen as needed, please follow up with a breast surgeon for evaluation  Side view of a breast with breast cysts.  A breast cyst is a sac in the breast that is filled with fluid. These cysts are usually noncancerous (benign). Breast cysts are most often in the upper, outer portion of the breast. One or more cysts may develop. They form when fluid builds up inside the breast glands.  There are several types of breast cysts. Some are too small to feel but can be seen with imaging tests, such as an X-ray of the breast (mammogram) or ultrasound.  Breast cysts do not increase your risk of breast cancer. They usually disappear after you no longer have a menstrual cycle (after menopause), unless you take artificial hormones (are on hormone replacement therapy or menopausal hormone therapy).  What are the causes?  This condition may be caused by:  Blockage of tubes (ducts) in the breast glands, which leads to fluid buildup. Duct blockage may result from:  Fibrocystic breast changes. This is a common, benign condition that occurs when women go through hormonal changes during the menstrual cycle. This is a common cause of multiple breast cysts.  Overgrowth of breast tissue or breast glands.  Scar tissue in the breast from previous surgery.  Changes in certain female hormones (estrogen and progesterone).  The exact cause of this condition is not known.  What increases the risk?  You may be more likely to develop breast cysts from 30 years of age until the time of menopause.  What are the signs or symptoms?  Symptoms of this condition include:  Feeling one or more smooth, round, soft lumps (like grapes) in the breast that are easily movable. The lump or lumps may get bigger and more painful before your menstrual period and get smaller after your menstrual period. They may also be painless.  Breast discomfort or pain.  How is this diagnosed?  This condition may be diagnosed based on:  A physical exam. Depending on the size of the cyst, your health care provider may be able to feel it during this exam.  Imaging tests, such as a mammogram or ultrasound.  Fluid may be removed from the cyst with a needle (fine-needle aspiration) and tested to make sure the cyst is not cancerous.  How is this treated?  Treatment may not be needed for this condition. Your health care provider may monitor the cyst to see if it goes away on its own. If the cyst is uncomfortable or gets bigger, or if you do not like how the cyst makes your breast look, you may need treatment. Treatment may include:  Hormone therapy.  Fine-needle aspiration to drain fluid from the cyst. There is a chance of the cyst coming back (recurring) after aspiration.  Surgery to remove the cyst.  Follow these instructions at home:  Self-exams  A person doing a breast self-exam, checking the upper part of the breast, around the nipple, and under the nipple.  Talk to your health care provider about breast self-exams. If recommended, do the exams as told. A breast self-exam involves:  Comparing your breasts in the mirror.  Looking for visible changes in your skin or nipples.  Feeling for lumps or changes.  Having many breast cysts may make it harder to feel for new lumps. Understand how your breasts normally look and feel, and write down any changes in your breasts. Tell your health care provider about any changes.  Eating and drinking  Follow instructions from your health care provider about what you may eat and drink.  Drink enough fluid to keep your urine pale yellow.  Avoid caffeine.  Cut down on salt (sodium) in what you eat and drink, especially before your menstrual period. Too much sodium can cause fluid buildup, breast swelling, and discomfort.  General instructions  See your health care provider regularly.  Get a yearly physical exam.  If you are 20–40 years old, get a clinical breast exam every 1–3 years. After the age of 40 years, get this exam every year.  Get mammograms as often as directed.  Take over-the-counter and prescription medicines only as told by your health care provider.  Wear a supportive bra, especially when exercising.  Contact a health care provider if:  You feel, or think you feel, a lump in your breast.  You notice that both breasts look or feel different than usual.  Your breast is still causing pain after your menstrual period is over.  You find new lumps or bumps that were not there before.  You feel lumps in your armpit.  Get help right away if:  You have severe pain, tenderness, redness, or warmth in your breast.  You have fluid or blood leaking from your nipple.  Your breast lump becomes hard and painful.  You notice dimpling or wrinkling of the breast or nipple.  Summary  A breast cyst is a sac in the breast that is filled with fluid.  Treatment may not be needed for this condition.  If the cyst is uncomfortable or gets bigger, or if you do not like how the cyst makes your breast look, you may need treatment.

## 2024-01-25 NOTE — ED ADULT NURSE NOTE - NSICDXFAMILYHX_GEN_ALL_CORE_FT
FAMILY HISTORY:  Father  Still living? Yes, Estimated age: Age Unknown  Family history of hypertension in father, Age at diagnosis: Age Unknown    Mother  Still living? Yes, Estimated age: Age Unknown  Family history of hypertension in mother, Age at diagnosis: Age Unknown

## 2024-01-25 NOTE — ED ADULT NURSE NOTE - NSFALLUNIVINTERV_ED_ALL_ED
Bed/Stretcher in lowest position, wheels locked, appropriate side rails in place/Call bell, personal items and telephone in reach/Instruct patient to call for assistance before getting out of bed/chair/stretcher/Non-slip footwear applied when patient is off stretcher/[place] to call system/Physically safe environment - no spills, clutter or unnecessary equipment/Purposeful proactive rounding/Room/bathroom lighting operational, light cord in reach

## 2024-01-26 ENCOUNTER — NON-APPOINTMENT (OUTPATIENT)
Age: 44
End: 2024-01-26

## 2024-02-01 ENCOUNTER — APPOINTMENT (OUTPATIENT)
Dept: BREAST CENTER | Facility: CLINIC | Age: 44
End: 2024-02-01

## 2024-02-14 ENCOUNTER — NON-APPOINTMENT (OUTPATIENT)
Age: 44
End: 2024-02-14

## 2024-02-14 ENCOUNTER — APPOINTMENT (OUTPATIENT)
Dept: BREAST CENTER | Facility: CLINIC | Age: 44
End: 2024-02-14
Payer: MEDICAID

## 2024-02-14 VITALS
HEART RATE: 73 BPM | WEIGHT: 145 LBS | SYSTOLIC BLOOD PRESSURE: 132 MMHG | OXYGEN SATURATION: 99 % | DIASTOLIC BLOOD PRESSURE: 86 MMHG | HEIGHT: 63 IN | BODY MASS INDEX: 25.69 KG/M2 | RESPIRATION RATE: 16 BRPM

## 2024-02-14 DIAGNOSIS — R92.30 DENSE BREASTS, UNSPECIFIED: ICD-10-CM

## 2024-02-14 DIAGNOSIS — Z78.9 OTHER SPECIFIED HEALTH STATUS: ICD-10-CM

## 2024-02-14 DIAGNOSIS — N64.59 OTHER SIGNS AND SYMPTOMS IN BREAST: ICD-10-CM

## 2024-02-14 DIAGNOSIS — N64.4 MASTODYNIA: ICD-10-CM

## 2024-02-14 DIAGNOSIS — Z82.49 FAMILY HISTORY OF ISCHEMIC HEART DISEASE AND OTHER DISEASES OF THE CIRCULATORY SYSTEM: ICD-10-CM

## 2024-02-14 PROCEDURE — 99203 OFFICE O/P NEW LOW 30 MIN: CPT

## 2024-02-14 NOTE — PAST MEDICAL HISTORY
[Surgical Menopause] : The patient is in surgical menopause [Menarche Age ____] : age at menarche was [unfilled] [Definite ___ (Date)] : the last menstrual period was [unfilled] [Regular Cycle Intervals] : have been regular [Amenorrhea] : amenorrhea [Total Preg ___] : G[unfilled] [Live Births ___] : P[unfilled]  [Full Term ___] : Full Term: [unfilled] [Premature ___] : Premature: [unfilled] [Age At Live Birth ___] : Age at live birth: [unfilled] [de-identified] : Hysterectomy [FreeTextEntry1] : Heavy bleeding/ bad cramps [FreeTextEntry5] : Hysterectomy-2016 Bilateral breast implants - 2009 [FreeTextEntry6] : N/A [FreeTextEntry7] : 21 years old/ IUD/ pills/ for 10 years [FreeTextEntry8] : N/A

## 2024-02-14 NOTE — REVIEW OF SYSTEMS
Chest pain may be related to GERD based on location and description.     PO PPI at d/c, follow up outpatient with GI if needed.      [As Noted in HPI] : as noted in HPI [Negative] : Heme/Lymph

## 2024-02-14 NOTE — HISTORY OF PRESENT ILLNESS
[FreeTextEntry1] : MICHELET is a 44 year old female, referred by [facility] ED, who presents for initial evaluation regarding a palpable abnormality of bilateral breasts.  The patient was seen in the ED on 1/25/2024 for a painful palpable right breast lump, she underwent a limited right breast US on 1/25/2024 in the ER showing a minimally complicated cyst in the right breast 10:00 7 cm FN, measuring 2.1 cm in the area of clinical concern, patient states the following week the right breast lump resolved.  Patient states about 3 days ago she noted a nontender palpable lump in the left breast 12:00 nipple areolar complex.   Of note patient has a history of bilateral saline implant based augmentation placed in 2009 by Dr. Lozano.  Patient states she followed up with her plastic surgeon about 3 years ago.  She states she is interested in proceeding with implant removal.  JOYA lifetime risk of 8.4%; Denies family history of breast or ovarian cancer

## 2024-02-14 NOTE — ASSESSMENT
[FreeTextEntry1] : 44-year-old female, palpable right breast cyst, now resolved, with left breast dense tissue noted in the 12:00 region 1 cm FN.  Discussed with the patient the results of the limited breast US completed in the ED on 1/25/2024 showing a complicated cyst in the right breast 10:00 7 cm FN measuring 2.1 cm discussed that her complaints of palpable right breast lump and pain in this region correlate to finding of cyst on US.  Discussed with the patient the clinical breast exam findings which are unremarkable for any dominant masses; however, discussed finding of vague density in the left breast 12:00 1 cm FN, discussed given patient had limited ultrasound exam that was completed on 1/25/2024 in the ED with history of palpable right breast complaint and pain discussed the recommendation to proceed with bilateral diagnostic mammogram/US now for further evaluation.  Discussed if imaging findings are benign, plan for follow-up in the office for repeat clinical exam in 6 months.

## 2024-02-14 NOTE — DATA REVIEWED
[FreeTextEntry1] : 6/12/2023 ([facility]) bilateral screening mammogram: Heterogeneously dense, bilateral subpectoral saline implants, no suspicious masses architectural distortion or significant calcifications are detected. A known cyst is again demonstrated in the superior lateral right breast. Screening breast ultrasound may be helpful to supplement mammography and patient with dense breast tissue. 9 BI-RADS 2  1/25/2024 ([facility]) Limited right breast US: Limited ultrasound performed in the emergency room showing a minimally complicated cyst right breast 10:00 7 cm FN measuring 2.1 cm. Underlying implant is not well-evaluated. Complicated cyst corresponding to region of clinical concern in the right breast. Implant rupture would be better evaluated on MRI. Annual mammography evaluation is the consensus recommendation for breast cancer screening.

## 2024-02-14 NOTE — PHYSICAL EXAM
[Normocephalic] : normocephalic [No Supraclavicular Adenopathy] : no supraclavicular adenopathy [Examined in the supine and seated position] : examined in the supine and seated position [No dominant masses] : no dominant masses in right breast  [No dominant masses] : no dominant masses left breast [No Nipple Retraction] : no left nipple retraction [No Nipple Discharge] : no left nipple discharge [No Axillary Lymphadenopathy] : no left axillary lymphadenopathy [No Edema] : no edema [No Swelling] : no swelling [No Rashes] : no rashes [No Ulceration] : no ulceration [de-identified] : vague density left breast 12:00 1cmFN, no dominant mass

## 2024-02-14 NOTE — PLAN
[TextEntry] : Bilateral DX mammogram/US now, will notify of results If imaging benign, patient to follow-up in 6 months for repeat clinical exam

## 2024-03-11 ENCOUNTER — EMERGENCY (EMERGENCY)
Facility: HOSPITAL | Age: 44
LOS: 1 days | Discharge: HOME | End: 2024-03-11
Attending: EMERGENCY MEDICINE | Admitting: EMERGENCY MEDICINE
Payer: COMMERCIAL

## 2024-03-11 VITALS
HEART RATE: 76 BPM | WEIGHT: 145.06 LBS | RESPIRATION RATE: 18 BRPM | DIASTOLIC BLOOD PRESSURE: 69 MMHG | TEMPERATURE: 98 F | SYSTOLIC BLOOD PRESSURE: 114 MMHG | HEIGHT: 63 IN | OXYGEN SATURATION: 97 %

## 2024-03-11 DIAGNOSIS — Z86.69 PERSONAL HISTORY OF OTHER DISEASES OF THE NERVOUS SYSTEM AND SENSE ORGANS: Chronic | ICD-10-CM

## 2024-03-11 DIAGNOSIS — Z98.82 BREAST IMPLANT STATUS: Chronic | ICD-10-CM

## 2024-03-11 DIAGNOSIS — K08.409 PARTIAL LOSS OF TEETH, UNSPECIFIED CAUSE, UNSPECIFIED CLASS: Chronic | ICD-10-CM

## 2024-03-11 DIAGNOSIS — Z90.710 ACQUIRED ABSENCE OF BOTH CERVIX AND UTERUS: Chronic | ICD-10-CM

## 2024-03-11 LAB
ADD ON TEST-SPECIMEN IN LAB: SIGNIFICANT CHANGE UP
ALBUMIN SERPL ELPH-MCNC: 4.4 G/DL — SIGNIFICANT CHANGE UP (ref 3.3–5)
ALP SERPL-CCNC: 110 U/L — SIGNIFICANT CHANGE UP (ref 40–120)
ALT FLD-CCNC: 57 U/L — HIGH (ref 10–45)
ANION GAP SERPL CALC-SCNC: 13 MMOL/L — SIGNIFICANT CHANGE UP (ref 5–17)
ANISOCYTOSIS BLD QL: SLIGHT — SIGNIFICANT CHANGE UP
AST SERPL-CCNC: 27 U/L — SIGNIFICANT CHANGE UP (ref 10–40)
BASOPHILS # BLD AUTO: 0.04 K/UL — SIGNIFICANT CHANGE UP (ref 0–0.2)
BASOPHILS NFR BLD AUTO: 0.9 % — SIGNIFICANT CHANGE UP (ref 0–2)
BILIRUB SERPL-MCNC: 0.5 MG/DL — SIGNIFICANT CHANGE UP (ref 0.2–1.2)
BUN SERPL-MCNC: 9 MG/DL — SIGNIFICANT CHANGE UP (ref 7–23)
CALCIUM SERPL-MCNC: 9.9 MG/DL — SIGNIFICANT CHANGE UP (ref 8.4–10.5)
CHLORIDE SERPL-SCNC: 101 MMOL/L — SIGNIFICANT CHANGE UP (ref 96–108)
CO2 SERPL-SCNC: 22 MMOL/L — SIGNIFICANT CHANGE UP (ref 22–31)
CREAT SERPL-MCNC: 0.59 MG/DL — SIGNIFICANT CHANGE UP (ref 0.5–1.3)
EGFR: 113 ML/MIN/1.73M2 — SIGNIFICANT CHANGE UP
EOSINOPHIL # BLD AUTO: 0 K/UL — SIGNIFICANT CHANGE UP (ref 0–0.5)
EOSINOPHIL NFR BLD AUTO: 0 % — SIGNIFICANT CHANGE UP (ref 0–6)
GIANT PLATELETS BLD QL SMEAR: PRESENT — SIGNIFICANT CHANGE UP
GLUCOSE SERPL-MCNC: 98 MG/DL — SIGNIFICANT CHANGE UP (ref 70–99)
HCG SERPL-ACNC: <0 MIU/ML — SIGNIFICANT CHANGE UP
HCT VFR BLD CALC: 39.1 % — SIGNIFICANT CHANGE UP (ref 34.5–45)
HGB BLD-MCNC: 12.8 G/DL — SIGNIFICANT CHANGE UP (ref 11.5–15.5)
LYMPHOCYTES # BLD AUTO: 0.9 K/UL — LOW (ref 1–3.3)
LYMPHOCYTES # BLD AUTO: 18.2 % — SIGNIFICANT CHANGE UP (ref 13–44)
MACROCYTES BLD QL: SLIGHT — SIGNIFICANT CHANGE UP
MANUAL SMEAR VERIFICATION: SIGNIFICANT CHANGE UP
MCHC RBC-ENTMCNC: 27.8 PG — SIGNIFICANT CHANGE UP (ref 27–34)
MCHC RBC-ENTMCNC: 32.7 GM/DL — SIGNIFICANT CHANGE UP (ref 32–36)
MCV RBC AUTO: 84.8 FL — SIGNIFICANT CHANGE UP (ref 80–100)
MICROCYTES BLD QL: SLIGHT — SIGNIFICANT CHANGE UP
MONOCYTES # BLD AUTO: 0 K/UL — SIGNIFICANT CHANGE UP (ref 0–0.9)
MONOCYTES NFR BLD AUTO: 0 % — LOW (ref 2–14)
NEUTROPHILS # BLD AUTO: 4.01 K/UL — SIGNIFICANT CHANGE UP (ref 1.8–7.4)
NEUTROPHILS NFR BLD AUTO: 80.9 % — HIGH (ref 43–77)
OVALOCYTES BLD QL SMEAR: SLIGHT — SIGNIFICANT CHANGE UP
PLAT MORPH BLD: ABNORMAL
PLATELET # BLD AUTO: 261 K/UL — SIGNIFICANT CHANGE UP (ref 150–400)
POIKILOCYTOSIS BLD QL AUTO: SLIGHT — SIGNIFICANT CHANGE UP
POLYCHROMASIA BLD QL SMEAR: SLIGHT — SIGNIFICANT CHANGE UP
POTASSIUM SERPL-MCNC: 3.8 MMOL/L — SIGNIFICANT CHANGE UP (ref 3.5–5.3)
POTASSIUM SERPL-SCNC: 3.8 MMOL/L — SIGNIFICANT CHANGE UP (ref 3.5–5.3)
PROT SERPL-MCNC: 7.9 G/DL — SIGNIFICANT CHANGE UP (ref 6–8.3)
RBC # BLD: 4.61 M/UL — SIGNIFICANT CHANGE UP (ref 3.8–5.2)
RBC # FLD: 14.6 % — HIGH (ref 10.3–14.5)
RBC BLD AUTO: ABNORMAL
SODIUM SERPL-SCNC: 136 MMOL/L — SIGNIFICANT CHANGE UP (ref 135–145)
WBC # BLD: 4.96 K/UL — SIGNIFICANT CHANGE UP (ref 3.8–10.5)
WBC # FLD AUTO: 4.96 K/UL — SIGNIFICANT CHANGE UP (ref 3.8–10.5)

## 2024-03-11 PROCEDURE — 83735 ASSAY OF MAGNESIUM: CPT

## 2024-03-11 PROCEDURE — 99284 EMERGENCY DEPT VISIT MOD MDM: CPT | Mod: 25

## 2024-03-11 PROCEDURE — 82962 GLUCOSE BLOOD TEST: CPT

## 2024-03-11 PROCEDURE — 85025 COMPLETE CBC W/AUTO DIFF WBC: CPT

## 2024-03-11 PROCEDURE — 83690 ASSAY OF LIPASE: CPT

## 2024-03-11 PROCEDURE — 80053 COMPREHEN METABOLIC PANEL: CPT

## 2024-03-11 PROCEDURE — 84484 ASSAY OF TROPONIN QUANT: CPT

## 2024-03-11 PROCEDURE — 99285 EMERGENCY DEPT VISIT HI MDM: CPT

## 2024-03-11 PROCEDURE — 96374 THER/PROPH/DIAG INJ IV PUSH: CPT

## 2024-03-11 PROCEDURE — 36415 COLL VENOUS BLD VENIPUNCTURE: CPT

## 2024-03-11 PROCEDURE — 84702 CHORIONIC GONADOTROPIN TEST: CPT

## 2024-03-11 PROCEDURE — 96375 TX/PRO/DX INJ NEW DRUG ADDON: CPT

## 2024-03-11 RX ORDER — FAMOTIDINE 10 MG/ML
20 INJECTION INTRAVENOUS ONCE
Refills: 0 | Status: COMPLETED | OUTPATIENT
Start: 2024-03-11 | End: 2024-03-11

## 2024-03-11 RX ORDER — ONDANSETRON 8 MG/1
4 TABLET, FILM COATED ORAL ONCE
Refills: 0 | Status: COMPLETED | OUTPATIENT
Start: 2024-03-11 | End: 2024-03-11

## 2024-03-11 RX ORDER — SODIUM CHLORIDE 9 MG/ML
1000 INJECTION INTRAMUSCULAR; INTRAVENOUS; SUBCUTANEOUS ONCE
Refills: 0 | Status: COMPLETED | OUTPATIENT
Start: 2024-03-11 | End: 2024-03-11

## 2024-03-11 RX ADMIN — SODIUM CHLORIDE 1000 MILLILITER(S): 9 INJECTION INTRAMUSCULAR; INTRAVENOUS; SUBCUTANEOUS at 23:40

## 2024-03-11 RX ADMIN — FAMOTIDINE 20 MILLIGRAM(S): 10 INJECTION INTRAVENOUS at 23:22

## 2024-03-11 RX ADMIN — ONDANSETRON 4 MILLIGRAM(S): 8 TABLET, FILM COATED ORAL at 23:40

## 2024-03-11 NOTE — ED ADULT TRIAGE NOTE - AS HEIGHT TYPE
You may now shower and get your incisions wet. We recommend starting scar massage now to your incision(s). Take Vitamin E, Cocoa Butter or Scar Cream and massage the incision 2 times a day. This will help soften your incisions and help de-sensitize the skin as the nerves \"wake up\".
stated

## 2024-03-11 NOTE — ED PROVIDER NOTE - PHYSICAL EXAMINATION
VITAL SIGNS: I have reviewed nursing notes and confirm.  CONSTITUTIONAL: Well appearing, in no acute distress.   SKIN:  warm and dry, no acute rash.   HEAD:  normocephalic, atraumatic.  EYES: EOM intact; conjunctiva and sclera clear.  ENT: No nasal discharge; airway clear.   NECK: Supple.  CARD: S1, S2, Regular rate and rhythm.   RESP:  Clear to auscultation b/l, no wheezes, rales or rhonchi.  ABD: Mild epigastric tenderness without rebound or guarding nauseated otherwise well-appearing  EXT: Normal ROM. No peripheral edema. Pulses intact and equal b/l.  NEURO: Alert, oriented, grossly unremarkable  PSYCH: Cooperative, mood and affect appropriate.

## 2024-03-11 NOTE — ED PROVIDER NOTE - PROGRESS NOTE DETAILS
Abdomen remains soft patient is well-appearing tolerating p.o. would like to go home has no abdominal pain lipase negative advised to discontinue Wegovy for weight loss and speak to her PCP/prescribing physician

## 2024-03-11 NOTE — ED ADULT NURSE NOTE - OBJECTIVE STATEMENT
Pt complaints of N/V since this morning. Report took 1st dose of Wegovy this morning and N/V started approx 1 hour after that.   No active vomit noted at this time. Pt denies any other associated discomfort.  Pt is alert and oriented, A&O4, steady gait noted. Pt complaints of N/V since this morning. Report took 1st dose of Wegovy for weight lose this morning and N/V started approx 1 hour after that.   No active vomit noted at this time. Pt denies any other associated discomfort.  Pt is alert and oriented, A&O4, steady gait noted.

## 2024-03-11 NOTE — ED PROVIDER NOTE - OBJECTIVE STATEMENT
44-year-old female without significant past medical history here today for multiple episodes of nausea and vomiting without associated chest pain or abdominal pain.  Has some mild epigastric discomfort.  Just initiated first dose of Wegovy today   For weight loss.  No fever chills or diarrhea.  Currently not experiencing abdominal pain has associated nausea which has been persistent.  No hematuria or dysuria.

## 2024-03-11 NOTE — ED ADULT NURSE NOTE - NSICDXPASTSURGICALHX_GEN_ALL_CORE_FT
PAST SURGICAL HISTORY:  H/O perforation of tympanic membrane right    H/O vaginal hysterectomy     Status post breast augmentation     [place] teeth extracted

## 2024-03-11 NOTE — ED PROVIDER NOTE - NSICDXPASTSURGICALHX_GEN_ALL_CORE_FT
PAST SURGICAL HISTORY:  H/O perforation of tympanic membrane right    H/O vaginal hysterectomy     Status post breast augmentation     [place] teeth extracted

## 2024-03-11 NOTE — ED PROVIDER NOTE - PATIENT PORTAL LINK FT
You can access the FollowMyHealth Patient Portal offered by [facility] by registering at the following website: [email or web address]. By joining [facility]’s FollowMyHealth portal, you will also be able to view your health information using other applications (apps) compatible with our system.

## 2024-03-11 NOTE — ED ADULT NURSE NOTE - NSFALLUNIVINTERV_ED_ALL_ED
Bed/Stretcher in lowest position, wheels locked, appropriate side rails in place/Call bell, personal items and telephone in reach/Instruct patient to call for assistance before getting out of bed/chair/stretcher/Non-slip footwear applied when patient is off stretcher/[place] to call system/Physically safe environment - no spills, clutter or unnecessary equipment/Purposeful proactive rounding/Room/bathroom lighting operational, light cord in reach

## 2024-03-11 NOTE — ED ADULT TRIAGE NOTE - CHIEF COMPLAINT QUOTE
N/V since this morning. Pt took 1st dose of Wegovy this morning and N/V started approx 1 hour after.

## 2024-03-11 NOTE — ED PROVIDER NOTE - CLINICAL SUMMARY MEDICAL DECISION MAKING FREE TEXT BOX
44-year-old female with nausea vomiting after taking Wegovy has not had any prior abdominal surgery has no abdominal pain currently is mildly tender in the epigastrium we will plan for additional treatment for gastritis/GERD evaluate for pancreatitis consider cholelithiasis and gallbladder pancreatitis in differential     plan for CBC CMP troponin lipase EKG GI cocktail Pepcid Zofran IV fluids hCG   EKG normal sinus rhythm 74 normal axis and intervals no acute ischemia no STEMI

## 2024-03-12 VITALS
DIASTOLIC BLOOD PRESSURE: 61 MMHG | TEMPERATURE: 98 F | RESPIRATION RATE: 18 BRPM | OXYGEN SATURATION: 99 % | HEART RATE: 76 BPM | SYSTOLIC BLOOD PRESSURE: 104 MMHG

## 2024-03-15 DIAGNOSIS — K29.70 GASTRITIS, UNSPECIFIED, WITHOUT BLEEDING: ICD-10-CM

## 2024-03-15 DIAGNOSIS — R11.2 NAUSEA WITH VOMITING, UNSPECIFIED: ICD-10-CM

## 2024-03-25 ENCOUNTER — NON-APPOINTMENT (OUTPATIENT)
Age: 44
End: 2024-03-25

## 2024-04-07 ENCOUNTER — NON-APPOINTMENT (OUTPATIENT)
Age: 44
End: 2024-04-07

## 2024-08-06 ENCOUNTER — APPOINTMENT (OUTPATIENT)
Dept: BREAST CENTER | Facility: CLINIC | Age: 44
End: 2024-08-06